# Patient Record
Sex: FEMALE | Race: WHITE | NOT HISPANIC OR LATINO | Employment: FULL TIME | URBAN - METROPOLITAN AREA
[De-identification: names, ages, dates, MRNs, and addresses within clinical notes are randomized per-mention and may not be internally consistent; named-entity substitution may affect disease eponyms.]

---

## 2017-04-14 ENCOUNTER — PRE VISIT (OUTPATIENT)
Dept: ENDOCRINOLOGY | Facility: CLINIC | Age: 42
End: 2017-04-14

## 2017-04-14 NOTE — TELEPHONE ENCOUNTER
Medical records are from [facility] and will be opened in [place] upon arrival of patient. Looks like she had NM testing done at [facility] and that has already been opened prior to visit.

## 2017-04-21 ENCOUNTER — OFFICE VISIT (OUTPATIENT)
Dept: ENDOCRINOLOGY | Facility: CLINIC | Age: 42
End: 2017-04-21

## 2017-04-21 VITALS
HEART RATE: 86 BPM | HEIGHT: 67 IN | BODY MASS INDEX: 25.75 KG/M2 | WEIGHT: 164.1 LBS | SYSTOLIC BLOOD PRESSURE: 130 MMHG | DIASTOLIC BLOOD PRESSURE: 70 MMHG

## 2017-04-21 DIAGNOSIS — C73 PAPILLARY THYROID CARCINOMA (H): ICD-10-CM

## 2017-04-21 DIAGNOSIS — C73 PAPILLARY THYROID CARCINOMA (H): Primary | ICD-10-CM

## 2017-04-21 DIAGNOSIS — R94.8 ABNORMAL RADIONUCLIDE BONE SCAN: ICD-10-CM

## 2017-04-21 DIAGNOSIS — E89.0 POSTSURGICAL HYPOTHYROIDISM: ICD-10-CM

## 2017-04-21 DIAGNOSIS — M89.9 BONE LESION: ICD-10-CM

## 2017-04-21 LAB
CALCIUM SERPL-MCNC: 9.1 MG/DL (ref 8.5–10.1)
T4 FREE SERPL-MCNC: 1.21 NG/DL (ref 0.76–1.46)
TSH SERPL DL<=0.05 MIU/L-ACNC: 5.97 MU/L (ref 0.4–4)

## 2017-04-21 RX ORDER — CHOLECALCIFEROL (VITAMIN D3) 100000/G
POWDER (GRAM) MISCELLANEOUS
COMMUNITY
End: 2017-08-23

## 2017-04-21 RX ORDER — LEVOTHYROXINE SODIUM 112 UG/1
112 TABLET ORAL
COMMUNITY
Start: 2017-02-06 | End: 2017-04-28 | Stop reason: DRUGHIGH

## 2017-04-21 ASSESSMENT — PAIN SCALES - GENERAL: PAINLEVEL: NO PAIN (0)

## 2017-04-21 NOTE — MR AVS SNAPSHOT
After Visit Summary   4/21/2017    Joy Quispe    MRN: [id number]           Patient Information     Date Of Birth          1975        Visit Information        Provider Department      4/21/2017 4:00 PM Katy Bah MD [facility]        Today's Diagnoses     Papillary thyroid carcinoma (H)    -  1    Postsurgical hypothyroidism           Follow-ups after your visit        Follow-up notes from your care team     Return in about 4 months (around 8/21/2017).      Your next 10 appointments already scheduled     Apr 21, 2017  5:00 PM CDT   LAB with  LAB   [facility] ([facility])    [address] [place] [id number] [phone]           Patient must bring picture ID.  Patient should be prepared to give a urine specimen  Please do not eat 10-12 hours before your appointment if you are coming in fasting for labs on lipids, cholesterol, or glucose (sugar).  Pregnant women should follow their Care Team instructions. Water with medications is okay. Do not drink coffee or other fluids.   If you have concerns about taking  your medications, please ask at office or if scheduling via Ink361, send a message by clicking on Secure Messaging, Message Your Care Team.            Aug 23, 2017  2:30 PM CDT   (Arrive by 2:15 PM)   RETURN ENDOCRINE with Katy Bah MD   [facility] ([facility])    [address] [place] [id number] [phone]              Future tests that were ordered for you today     Open Future Orders        Priority Expected Expires Ordered    Calcium Routine  4/21/2018 4/21/2017    TSH Routine  4/21/2018 4/21/2017    T4 free Routine  4/21/2018 4/21/2017    Thyroglobulin (Total, antibody & recovery %) Routine  4/21/2018 4/21/2017            Who to contact     Please call your clinic at [phone] to:    Ask questions about your health    Make or  "cancel appointments    Discuss your medicines    Learn about your test results    Speak to your doctor   If you have compliments or concerns about an experience at your clinic, or if you wish to file a complaint, please contact [facility] Patient Relations at [phone] or email us at [email or web address]         Additional Information About Your Visit        D[employer] Recomend[employer] Information     One-Songt is an electronic gateway that provides easy, online access to your medical records. With [employer], you can request a clinic appointment, read your test results, renew a prescription or communicate with your care team.     To sign up for [employer] visit the website at [email or web address]   You will be asked to enter the access code listed below, as well as some personal information. Please follow the directions to create your username and password.     Your access code is: [email or web address]  Expires: 2017  6:30 AM     Your access code will  in 90 days. If you need help or a new code, please contact your [facility] or call [phone] for assistance.        Care EveryWhere ID     This is your Care EveryWhere ID. This could be used by other organizations to access your [place] medical records  [id number]        Your Vitals Were     Pulse Height BMI (Body Mass Index)             86 1.702 m (5' 7\") 25.7 kg/m2          Blood Pressure from Last 3 Encounters:   17 130/70    Weight from Last 3 Encounters:   17 74.4 kg (164 lb 1.6 oz)               Primary Care Provider Office Phone # Fax #    Valeri Field [phone]       [facility] [address] [facility] [address]        Thank you!     Thank you for choosing [facility]  for your care. Our goal is always to provide you with excellent care. Hearing back from our patients is one way we can continue to improve our services. Please take a few minutes " to complete the written survey that you may receive in the mail after your visit with us. Thank you!             Your Updated Medication List - Protect others around you: Learn how to safely use, store and throw away your medicines at www.disposemymeds.org.          This list is accurate as of: 4/21/17  4:46 PM.  Always use your most recent med list.                   Brand Name Dispense Instructions for use    levonorgestrel 20 MCG/24HR IUD    MIRENA         SYNTHROID 112 MCG tablet   Generic drug:  levothyroxine      Take 112 mcg by mouth       Vitamin D3 738760 UNIT/GM Powd      5,000 Unit/kit by Misc.(Non-Drug; Combo Route) route once daily.

## 2017-04-21 NOTE — PROGRESS NOTES
Endocrine Consult note    Attending Assessment/Plan :     1.  Papillary thyroid carcinoma, unifocal 2.5 cm, isthmus region, LN +  pT2, pN1, pMx, possible cM1 but this is very unclear .  MACIS  40*0.08  2.5*0.3  0-1 for completeness  0 for ETE  0-3 for distant  MACISis 3.95 best case, and 7.95 worst case if she has distant mets and incomplete resection    I am not confident from review of the path report that the bulky mass seen inferior  thyroid on preop US was removed.   She will need earlier follow up neck US  -    2.  Post surgical hypothyroidism. Labs today. Treat to TSH < 0.4.    Labs following the appt show TSH 5.97.  Increase LT4 to 125 mcg/day.      3.  Left lower quadrant uptake on post therapy TBS performed at [facility] today - I see this on the post therapy scan that appeared on Care everywhere  following the appt. It is apparent that SPECT overlay was not performed which is very unfortunate.      4.  + SIMON , hashimoto on background thyroid. This will give falsely low Tg on the outside assay. Repeat Tg/ SIMON - we send out to [facility] which will use MIRNA in the presence of antibody.    5.  Abnormal bone CT, abnormal bone scan . This was discovered following the appt as we attempted to understand the question raised by the outside 1311 post therapy TBS.    A Right sacral ala on bone scan  - sclerotic on CT coronal per my review of images  B) left acetabular on bone scan, sclerotic- - this area is likely too low to correspond to what was seen on 131I .    Late SPECT overlay 131I TBS at [facility] did not show abnormal uptake but the signal was lower by then.     Katy Bah MD    Chief complaint:  Joy is a 41 year old female seen in consultation at the request of Dr Valeri Field for new thyroid cancer.   I have reviewed Care everywhere including provider notes, lab reports and imaging reports as indicated.        HISTORY OF PRESENT ILLNESS  She is already under the care of another endocrinologist, Dr Reza Ahn   "But she continues to have questions which is why she is presenting here today.  She has questions about her condition - Hashimotos, future plans  She had therapeutic 131I at [facility] last week with post therapy scan there today.  At the time of the appt I did not have the post therapy scan results. Joy had believed there were no problems or concerns with the studies.     In November she was found by her gynecologist, Dr Valeri Zhang,  to have a thyroid nodule. She had gone in for routine GYN evaluation due to her mother's diagnosis of uterine cancer.  Joy's exam showed a \"tiny right sided thyroid nodule\".  US  showed 3 nodules. She had FNAB of 2 nodules, (12/19/16) which  showed PCT.  She underwent total thyroidectomy 2/6/17 (Dr Espinoza Taylor, [facility]).  I have reviewed the operative report and surgical pathology report which was read as showing unifocal 2.5 cm Papillary thyroid carcinoma, isthmus with extension into right and left lobes.  3/6 lymph nodes were + for metastatic thyroid cancer.  Background lymphocytic thyroiditis was seen.   She has been on LT4 at the present dose since the surgery. She has been under the care of Dr Ahn. She says she had 2 dose Thyrogen (4/10, 4/11, dose 123I 4/12, scan 4/13 and treat 4/13).      She is currently on Synthroid brand 112 mcg/day since surgery.     We have the following imaging reports:   4/13/17: 123I 6.6 mCi total body scan (Thyrogen stimulated; [facility]) moderate uptake thyroid bed.   4/13/17: 101 mCi 131I  4/21/17 post treatment TBS - I didn't have this at the time of the appt but it became available afterwards.  The study is raising questions about the left lower quadrant, including left pelvis or GI.  4/21/17 labs following appt Tg 4.2, SIMON 115, TSH 5.97      4/19/04: TSH 3.48  1/11/05: TSH 1.27, free T4 1.3  9/27/05: , free T4 1.1  3/8/08: TSH 2.86, free T4 1.1  11/9/10: TSH 2.71  9/1916: TSH 2.31  10/21/16: TPO 1060  4/13/17: Tg " 0.1, SIMON 210.3    I have reviewed images on PACS (all long  following the appt)  10/24/16 thyroid US: subtle swiss cheese thyroid; right isthmus suspicoius nodule - irregular, hypoechoic, 1.5 x 0.9 x 1.5 cm , microcalc- this had FNAB 12/19/16   midline isthmus hypoechoic well defined mass 1.1 x 0.5 x 1 cm  - this could be LN-- this had NFAB 12/19/16  Mass inferior to thyroid 3  X 1.2 x 1.5 cm  12/19/16 FNAB of 2 thyroid masses  4/13/17 123I TBS  - neck uptake - no other abnormal uptake  4/21/17: post therapy scan ; neck uptake.  I can see what the radiologist was referring to but I wouldn't have called it - tiny focus left abdomen lateral  And also 2 foci midline pelvis- one I would attribute to bladder and the other ?   4/28/17 CT pelvis -I can see the sclerotic bone lesions as described by radiologist.  I agree they do not correspond to likely location of 131I uptake seen on outside TBS . Radiologist states some definitely represent bone islands but some are indeterminant .  5/5/17 bone scan: 2 areas noted by radiologist s- anterior column left acetabulum and right sacral body.      REVIEW OF SYSTEMS  Feels better than prior to diagnosis - used ot have symptoms that are all gone  No longer had hair loss  Losing the weight  Cardiac: negative  Respiratory: hasn't returned to exericse;   GI: negative  Less brain foggy  No headaches if she avoids glutern  LMP prior to IUD - hasn't had one in about 6 years  Headaches rarely  10 system ROS otherwise as per the HPI or negative    Past Medical History  Past Medical History:   Diagnosis Date     Papillary thyroid carcinoma (H) 02/06/2017     Post-surgical hypothyroidism 02/06/2017       Medications    Current Outpatient Prescriptions   Medication Sig Dispense Refill     levothyroxine (SYNTHROID) 112 MCG tablet Take 112 mcg by mouth       levonorgestrel (MIRENA) 20 MCG/24HR IUD        Cholecalciferol (VITAMIN D3) 648896 UNIT/GM POWD 5,000 Unit/kit by Misc.(Non-Drug; Combo  "Route) route once daily.         Allergies  No Known Allergies    Family History  family history includes Endometrial Cancer in her mother; Thyroid Disease in her maternal grandmother. There is no history of Thyroid Cancer or DIABETES.     Goiter in maternal great GM;     Social History  Social History   Substance Use Topics     Smoking status: Not on file     Smokeless tobacco: Not on file     Alcohol use Not on file     2 kids, 12 and 10 yrs of ; not back to running again .  ;     Physical Exam  /70  Pulse 86  Ht 1.702 m (5' 7\")  Wt 74.4 kg (164 lb 1.6 oz)  BMI 25.7 kg/m2  Body mass index is 25.7 kg/(m^2).  GENERAL :young woman   In no apparent distress  SKIN: Normal color, normal temperature, texture.  No hirsutism, alopecia or purple striae.     EYES: PERRL, EOMI, No scleral icterus,  No proptosis, conjunctival redness, stare, retraction  MOUTH: Moist, pink; pharynx clear  NECK: low transverse surgical scar.  No visible masses. No palpable adenopathy, or masses. No carotid bruits.   THYROID:  Not palpable  RESP: Lungs clear to auscultation bilaterally  CARDIAC: Regular rate and rhythm, normal S1 S2, without murmurs, rubs or gallops    ABDOMEN: Normal bowel sounds; soft, nontender, no HSM or masses       NEURO: awake, alert, responds appropriately to questions.  Cranial nerves intact.  Moves all extremities; Gait normal.  No tremor of the outstretched hand.  DTRs  2 /4 with delayed relaxation phase KJ ,   EXTREMITIES: No clubbing, cyanosis or edema.    DATA REVIEW  Results for RHIANNON SILVA (MRN [id number]) as of 4/21/2017 18:28   Ref. Range 4/21/2017 17:01   Calcium Latest Ref Range: 8.5 - 10.1 mg/dL 9.1   T4 Free Latest Ref Range: 0.76 - 1.46 ng/dL 1.21   TSH Latest Ref Range: 0.40 - 4.00 mU/L 5.97 (H)     Comment:  [facility]  Surgical Pathology Laboratory  [address] [place]   [address]  Tel: [phone]  Fax:  [phone]    SURGICAL PATHOLOGY " REPORT    Patient Name: RHIANNON SILVA Specimen No: [id number] Room No: [facility] [place] Age:  41 Sex: F Taken: 2017 Med. Rec. #: [id number] :  1975 Received: 2017 Physician(s): ROWENA Azul MD Service:  Reported: 2017   Encounter  No: [id number]       FINAL DIAGNOSIS    Thyroid, total thyroidectomy -        Papillary carcinoma       Metastatic carcinoma involves three of six perithyroidal  lymph nodes (3/6)    Procedure:  Total thyroidectomy     Lymph node sampling:  Focused lymph node resection        Tumor laterality:  Isthmus, with extension into right and left  lobes  Tumor focality:  Unifocal      Tumor size:  2.5 cm greatest dimension    Histologic type:  Papillary carcinoma      Margins:  Indeterminate, cauterized tumor abuts posterior margin  Angioinvasion (vascular invasion): Not identified  Lymphatic invasion:  Not identified  Perineural invasion:  Not identified  Extrathyroidal extension:  Present, minimal  Regional lymph nodes:  Metastatic carcinoma involves three of six  lymph nodes (3/6)     Extranodal extension: not identified  Additional pathologic findings: Lymphocytic thyroiditis, small  colloid nodules  Tumor block(s) for molecular/send out tests: A4   Pathologic Staging (AJCC 7th ed):  pT3   N1a                               Electronically Signed Out   Eric N. Kehrberg MD   jmb    ____________________________________________    Clinical History  Papillary thyroid carcinoma ([id number])    Gross  Thyroid and lymph nodes:  A 21 gram total thyroid consisting of a  left lobe (5 cm superior to inferior, 2 cm transversely, 1.5 cm  anterior to posterior) isthmus (2.5 cm superior to inferior, 1 cm  anterior to posterior, 1.2 cm transversely) and right lobe (4.5  cm superior to inferior, 2.5 cm transversely and 1.5 cm anterior  to posterior).  The specimen is inked as follows:  anterior -  blue; posterior - black.  Sectioning reveals a 2 x 1.5 x 1 cm  colloid nodule in  the mid to inferior aspect of the left lobe.   The isthmus is replaced by a 2.5 x 1.3 x 1 cm yellow-tan firm  mass that extends into the right upper lobe, and to a lesser  extent the left upper lobe.  The remaining right lobe is  sectioned to reveal small colloid nodules.  The uninvolved  thyroid parenchyma is beefy red and unremarkable.  There are  three separate lymph nodes ranging from 0.4 to 0.6cm in greatest  dimension.  Summary of cassettes: 1-3 - mass from isthmus  extending into right lobe; 4-5 - entire isthmus mass; 6 - mass  extending to left lobe; 7 - nodule from left lobe; 8 - right  lobe; 9 - left lobe; 10 - three lymph nodes, one inked blue, one  inked black, each bisected.  This case is reviewed with Dr. Kehrberg.  ()     MICROSCOPIC  Sections from the mass show papillary thyroid carcinoma that  focally invades into the perithyroid soft tissues.  Cauterized  tumor abuts the posterior margin, precluding definitive  evaluation, although no unequivocal margin involvement is seen.   The uninvolved thyroid shows colloid nodules and background  lymphocytic thyroiditis.  Metastatic carcinoma involves three of  six perithyroidal lymph nodes, including one of the three that  were separately submitted.      CPT CODES  A: 76701    Result Narrative   Indication:  41-year-old female.  History of thyroidectomy for thyroid carcinoma.  High dose I 131 therapy performed April 13, 2017.  Follow up.    Technique:  Post treatment I 131 whole body scan.    Comparison:  I-123 whole body scan April 13, 2017.    Findings:  Single focus of activity in the thyroid bed is the remnant being treated.    There is a small nodular focus of activity projected over the left lower quadrant. This was not present on the I-123 scan It is uncertain if this is within the left iliac bone or merely focal activity in the GI tract.  Although this is more likely within the gastrointestinal tract, consider a limited CT of the pelvis to assess  "for any skeletal metastases or lymph nodes.    Impression:   1. Focal activity within the thyroid bed which is the remnant being treated.    2. Focal activity left lower quadrant of uncertain anatomic position.  This could be within the GI tract or perhaps the iliac bone. Consider CT of the pelvis.    Dictated by Kirill Tirado MD @ 2017  3:25PM    (Electronically Signed)     Comment:  [facility]  Surgical Pathology Laboratory  [address] [place] [address]   [address]  Tel: [phone]  Fax:  [phone]    SURGICAL PATHOLOGY REPORT    Patient Name: RHIANNON SILVA Specimen No: [id number] Room No: OP   Age:  41 Sex: F Taken: 2016 Med. Rec. #: [id number] :  1975 Received: 2016 Physician(s): Reza Berg MD Service: SPEC IMG Reported: 2016    Encounter No: [id number]       FINAL DIAGNOSIS    A.     Inferior to thyroid nodule, fine needle aspiration -  Papillary carcinoma.   B.     Isthmus nodule, fine needle aspiration - Papillary  carcinoma.  C.     Right lower pole nodule, fine needle aspiration -  Papillary carcinoma.                                                                                                                                                                                                                  Electronically Signed Out   Marie Barriga MD       ____________________________________________      Gross       A.     Inferior to thyroid, 3.0 x 1.5 x 1.2 cm, fine needle  aspiration:  ([id number]) Rapid on-site evaluation for adequacy is  performed by Dr. Ho and reported to  Dr. Berg  as  follows:         Evaluation episode #1:  \"adequate\"       Pass #1-5:  Slides prepared: 3 Diff-Quik, 2 H&E         B.     Isthmus 1.1 x 0.9 x 0.5 cm, fine needle aspiration:   Rapid on-site evaluation for adequacy is performed by Dr. Ho and reported to  Dr. Berg as follows:         " "Evaluation episode #1:  \"adequate\"       Pass #1-5:  Slides prepared: 3 Diff-Quik, 2 H&E         C.     Right lower pole, 1.5 x 1.5 x 0.9 cm, fine needle  aspiration:  Rapid on-site evaluation for adequacy is performed  by Dr. Ho and reported to  Dr. Berg as follows:         Evaluation episode #1:  \"adequate\"       Pass #1-5:  Slides prepared: 3 Diff-Quik, 2 H&E                MICROSCOPIC       A-C      The smears show multiple aggregates of follicular  epithelial cells with numerous intranuclear inclusions, variation  in nuclear size, and focal crowding. Multinucleated cells are  identified. There are no psammoma bodies. In part C, there is a  small amount of thick colloid. Parts B and C show  three-dimensional structures consistent with papillary  architecture. Overall, the findings are consistent with papillary  carcinoma.          CPT CODES  A: 11521, 98957  B: 23108, 21864  C: 16662, 43069    NM ONCOLOGY THYROID WHOLE BODY POST TREATMENT4/13/2017  [facility]  Result Narrative   HISTORY:  41-year-old female.  Status post total thyroidectomy at [facility] on 02/06/2017 for papillary thyroid carcinoma.  Tumor measured 2.5 cm greatest dimension and involved the isthmus was with extension into the right and left lobes.  Cauterized tumor abutted the posterior margin.  Metastatic carcinoma involved 3 of 6 lymph nodes taken.    Technique:  This study is performed with the Thyrogen protocol.  6.6 millicuries of I 123 was administered orally on 04/12/2017.  24 hour whole body images were obtained in the anterior and posterior projections.    Findings:  There is physiologic uptake of activity by the salivary glands, GI and  tracts.    Moderate uptake is noted in the thyroid bed in the neck.    There is no evidence of distant metastatic disease outside the neck.    Impression:   1. There is moderate uptake noted in the thyroid bed in the neck.   2. There is " no evidence of distant metastatic disease outside the neck.   3. These findings were called to and discussed with Dr. Ahn. Dr. Ahn recommended high dose I 131 ablation treatment.    Dictated by Les Beasley MD @ Apr 13 2017 10:54AM    (Electronically Signed)       US YNRUYDU29/24/2016  [facility]  Result Impression   IMPRESSION:    1.  Diffusely heterogeneous echotexture throughout the thyroid parenchyma.  2.  There are 2 distinct solid nodules, one in the right lobe, and the other within the isthmus. The patient reports a prior benign thyroid biopsy, presumably of one of these nodules. No comparison examination available.  3.  In addition, there is a bilobed 3.0 x 1.5 x 1.2 cm solid nodule inferior to the thyroid gland. No clear claw sign with the adjacent thyroid gland, but this could be an exophytic thyroid nodule. Appearance is not typical of a parathyroid adenoma. Ultrasound-guided fine-needle aspiration could be attempted.   Result Narrative   EXAM: THYROID ULTRASOUND, 10/24/2016    CLINICAL DATA: Thyroid nodules. Prior benign thyroid biopsy.    COMPARISON: None available    TECHNIQUE: High frequency linear transducer with grayscale and color Doppler imaging.    FINDINGS: The right lobe of the thyroid measures 5.1 x 1.9 x 1.7 cm. Parenchyma is diffusely heterogeneous. There is a solid slightly hypoechoic nodule near the junction with the isthmus measuring 1.5 x 1.5 x 0.9 cm.    The left lobe of the thyroid measures 4.0 x 1.8 x 1.6 cm. Parenchyma is diffusely heterogeneous without a discrete nodule.    The isthmus is thickened, 8 mm. 1.1 x 0.9 x 0.5 cm solid oval hypoechoic solid nodule within the isthmus.    Hypoechoic bilobed nodule inferior to the thyroid measuring 3.0 x 1.5 x 1.2 cm.

## 2017-04-21 NOTE — LETTER
4/21/2017       RE: Joy Quispe  [address] [place] [address]     Dear Colleague,    Thank you for referring your patient, Joy Quispe, to the [facility] ENDOCRINOLOGY at [facility]. Please see a copy of my visit note below.    4/27/17 phone conversatoin with Joy  (945 PM, she returned from [place] I had sent her yesterday) about the post therapy scan report from [facility].  I will order pelvic CT to answer the question about their scan.    Katy Bah MD    4/28/17 phone conversation with Joy (533 PM).  I have informed her of the results of CT and recommendation for bone scan.  I am  Unable to explain to her the differential diagnosis for the CT bone findings. I  Have recommended we get the bone scan as recommended by radiology.    Katy Bah MD    Endocrine Consult note    Attending Assessment/Plan :     1.  Papillary thyroid carcinoma, unifocal 2.5 cm, isthmus region, LN +  pT2, pN1, pMx, possible cM1 but this is very unclear .  MACIS  40*0.08  2.5*0.3  0-1 for completeness  0 for ETE  0-3 for distant  MACISis 3.95 best case, and 7.95 worst case if she has distant mets and incomplete resection    I am not confident from review of the path report that the bulky mass seen inferior  thyroid on preop US was removed.   She will need earlier follow up neck US  -    2.  Post surgical hypothyroidism. Labs today. Treat to TSH < 0.4.    Labs following the appt show TSH 5.97.  Increase LT4 to 125 mcg/day.      3.  Left lower quadrant uptake on post therapy TBS performed at [facility] today - I see this on the post therapy scan that appeared on Care everywhere  following the appt. It is apparent that SPECT overlay was not performed which is very unfortunate.      4.  + SIMON , hashimoto on background thyroid. This will give falsely low Tg on the outside assay. Repeat Tg/ SIMON - we send out to [facility] which will use MIRNA in the presence of antibody.    5.  Abnormal bone  "CT, abnormal bone scan . This was discovered following the appt as we attempted to understand the question raised by the outside 1311 post therapy TBS.    A Right sacral ala on bone scan  - sclerotic on CT coronal per my review of images  B) left acetabular on bone scan, sclerotic- - this area is likely too low to correspond to what was seen on 131I .    Late SPECT overlay 131I TBS at [facility] did not show abnormal uptake but the signal was lower by then.     Katy Bah MD    Chief complaint:  Joy is a 41 year old female seen in consultation at the request of Dr Valeri Field for new thyroid cancer.   I have reviewed Care everywhere including provider notes, lab reports and imaging reports as indicated.        HISTORY OF PRESENT ILLNESS  She is already under the care of another endocrinologist, Dr Reza Ahn  But she continues to have questions which is why she is presenting here today.  She has questions about her condition - Hashimotos, future plans  She had therapeutic 131I at [facility] last week with post therapy scan there today.  At the time of the appt I did not have the post therapy scan results. Joy had believed there were no problems or concerns with the studies.     In November she was found by her gynecologist, Dr Valeri Zhang,  to have a thyroid nodule. She had gone in for routine GYN evaluation due to her mother's diagnosis of uterine cancer.  Joy's exam showed a \"tiny right sided thyroid nodule\".  US  showed 3 nodules. She had FNAB of 2 nodules, (12/19/16) which  showed PCT.  She underwent total thyroidectomy 2/6/17 (Dr Espinoza Taylor, [facility]).  I have reviewed the operative report and surgical pathology report which was read as showing unifocal 2.5 cm Papillary thyroid carcinoma, isthmus with extension into right and left lobes.  3/6 lymph nodes were + for metastatic thyroid cancer.  Background lymphocytic thyroiditis was seen.   She has been on LT4 at the present dose since " the surgery. She has been under the care of Dr Ahn. She says she had 2 dose Thyrogen (4/10, 4/11, dose 123I 4/12, scan 4/13 and treat 4/13).      She is currently on Synthroid brand 112 mcg/day since surgery.     We have the following imaging reports:   4/13/17: 123I 6.6 mCi total body scan (Thyrogen stimulated; [facility]) moderate uptake thyroid bed.   4/13/17: 101 mCi 131I  4/21/17 post treatment TBS - I didn't have this at the time of the appt but it became available afterwards.  The study is raising questions about the left lower quadrant, including left pelvis or GI.  4/21/17 labs following appt Tg 4.2, SIMON 115, TSH 5.97      4/19/04: TSH 3.48  1/11/05: TSH 1.27, free T4 1.3  9/27/05: , free T4 1.1  3/8/08: TSH 2.86, free T4 1.1  11/9/10: TSH 2.71  9/1916: TSH 2.31  10/21/16: TPO 1060  4/13/17: Tg 0.1, SIMON 210.3    I have reviewed images on PACS (all long  following the appt)  10/24/16 thyroid US: subtle swiss cheese thyroid; right isthmus suspicoius nodule - irregular, hypoechoic, 1.5 x 0.9 x 1.5 cm , microcalc- this had FNAB 12/19/16   midline isthmus hypoechoic well defined mass 1.1 x 0.5 x 1 cm  - this could be LN-- this had NFAB 12/19/16  Mass inferior to thyroid 3  X 1.2 x 1.5 cm  12/19/16 FNAB of 2 thyroid masses  4/13/17 123I TBS  - neck uptake - no other abnormal uptake  4/21/17: post therapy scan ; neck uptake.  I can see what the radiologist was referring to but I wouldn't have called it - tiny focus left abdomen lateral  And also 2 foci midline pelvis- one I would attribute to bladder and the other ?   4/28/17 CT pelvis -I can see the sclerotic bone lesions as described by radiologist.  I agree they do not correspond to likely location of 131I uptake seen on outside TBS . Radiologist states some definitely represent bone islands but some are indeterminant .  5/5/17 bone scan: 2 areas noted by radiologist s- anterior column left acetabulum and right sacral body.      REVIEW OF  "SYSTEMS  Feels better than prior to diagnosis - used ot have symptoms that are all gone  No longer had hair loss  Losing the weight  Cardiac: negative  Respiratory: hasn't returned to exericse;   GI: negative  Less brain foggy  No headaches if she avoids glutern  LMP prior to IUD - hasn't had one in about 6 years  Headaches rarely  10 system ROS otherwise as per the HPI or negative    Past Medical History  Past Medical History:   Diagnosis Date     Papillary thyroid carcinoma (H) 02/06/2017     Post-surgical hypothyroidism 02/06/2017       Medications    Current Outpatient Prescriptions   Medication Sig Dispense Refill     levothyroxine (SYNTHROID) 112 MCG tablet Take 112 mcg by mouth       levonorgestrel (MIRENA) 20 MCG/24HR IUD        Cholecalciferol (VITAMIN D3) 975574 UNIT/GM POWD 5,000 Unit/kit by Misc.(Non-Drug; Combo Route) route once daily.         Allergies  No Known Allergies    Family History  family history includes Endometrial Cancer in her mother; Thyroid Disease in her maternal grandmother. There is no history of Thyroid Cancer or DIABETES.     Goiter in maternal great GM;     Social History  Social History   Substance Use Topics     Smoking status: Not on file     Smokeless tobacco: Not on file     Alcohol use Not on file     2 kids, 12 and 10 yrs of ; not back to running again .  ;     Physical Exam  /70  Pulse 86  Ht 1.702 m (5' 7\")  Wt 74.4 kg (164 lb 1.6 oz)  BMI 25.7 kg/m2  Body mass index is 25.7 kg/(m^2).  GENERAL :young woman   In no apparent distress  SKIN: Normal color, normal temperature, texture.  No hirsutism, alopecia or purple striae.     EYES: PERRL, EOMI, No scleral icterus,  No proptosis, conjunctival redness, stare, retraction  MOUTH: Moist, pink; pharynx clear  NECK: low transverse surgical scar.  No visible masses. No palpable adenopathy, or masses. No carotid bruits.   THYROID:  Not palpable  RESP: Lungs clear to auscultation bilaterally  CARDIAC: Regular rate " and rhythm, normal S1 S2, without murmurs, rubs or gallops    ABDOMEN: Normal bowel sounds; soft, nontender, no HSM or masses       NEURO: awake, alert, responds appropriately to questions.  Cranial nerves intact.  Moves all extremities; Gait normal.  No tremor of the outstretched hand.  DTRs  2 /4 with delayed relaxation phase KJ ,   EXTREMITIES: No clubbing, cyanosis or edema.    DATA REVIEW  Results for RHIANNON SILVA (MRN [id number]) as of 2017 18:28   Ref. Range 2017 17:01   Calcium Latest Ref Range: 8.5 - 10.1 mg/dL 9.1   T4 Free Latest Ref Range: 0.76 - 1.46 ng/dL 1.21   TSH Latest Ref Range: 0.40 - 4.00 mU/L 5.97 (H)     Comment:  [facility]  Surgical Pathology Laboratory  [address] [place] [address]   [address]  Tel: [phone]  Fax:  [phone]    SURGICAL PATHOLOGY REPORT    Patient Name: RHIANNON SILVA Specimen No: [id number] Room No: [facility] Age:  41 Sex: F Taken: 2017 Med. Rec. #: [id number] :  1975 Received: 2017 Physician(s): ROWENA Azul MD Service:  Reported: 2017   Encounter  No: [id number]       FINAL DIAGNOSIS    Thyroid, total thyroidectomy -        Papillary carcinoma       Metastatic carcinoma involves three of six perithyroidal  lymph nodes (3/6)    Procedure:  Total thyroidectomy     Lymph node sampling:  Focused lymph node resection        Tumor laterality:  Isthmus, with extension into right and left  lobes  Tumor focality:  Unifocal      Tumor size:  2.5 cm greatest dimension    Histologic type:  Papillary carcinoma      Margins:  Indeterminate, cauterized tumor abuts posterior margin  Angioinvasion (vascular invasion): Not identified  Lymphatic invasion:  Not identified  Perineural invasion:  Not identified  Extrathyroidal extension:  Present, minimal  Regional lymph nodes:  Metastatic carcinoma involves three of six  lymph nodes (3/6)     Extranodal extension: not identified  Additional  pathologic findings: Lymphocytic thyroiditis, small  colloid nodules  Tumor block(s) for molecular/send out tests: A4   Pathologic Staging (AJCC 7th ed):  pT3   N1a                               Electronically Signed Out   Eric N. Kehrberg MD jmb    ____________________________________________    Clinical History  Papillary thyroid carcinoma ([id number])    Gross  Thyroid and lymph nodes:  A 21 gram total thyroid consisting of a  left lobe (5 cm superior to inferior, 2 cm transversely, 1.5 cm  anterior to posterior) isthmus (2.5 cm superior to inferior, 1 cm  anterior to posterior, 1.2 cm transversely) and right lobe (4.5  cm superior to inferior, 2.5 cm transversely and 1.5 cm anterior  to posterior).  The specimen is inked as follows:  anterior -  blue; posterior - black.  Sectioning reveals a 2 x 1.5 x 1 cm  colloid nodule in the mid to inferior aspect of the left lobe.   The isthmus is replaced by a 2.5 x 1.3 x 1 cm yellow-tan firm  mass that extends into the right upper lobe, and to a lesser  extent the left upper lobe.  The remaining right lobe is  sectioned to reveal small colloid nodules.  The uninvolved  thyroid parenchyma is beefy red and unremarkable.  There are  three separate lymph nodes ranging from 0.4 to 0.6cm in greatest  dimension.  Summary of cassettes: 1-3 - mass from isthmus  extending into right lobe; 4-5 - entire isthmus mass; 6 - mass  extending to left lobe; 7 - nodule from left lobe; 8 - right  lobe; 9 - left lobe; 10 - three lymph nodes, one inked blue, one  inked black, each bisected.  This case is reviewed with Dr. Kehrberg.  (JR)     MICROSCOPIC  Sections from the mass show papillary thyroid carcinoma that  focally invades into the perithyroid soft tissues.  Cauterized  tumor abuts the posterior margin, precluding definitive  evaluation, although no unequivocal margin involvement is seen.   The uninvolved thyroid shows colloid nodules and background  lymphocytic  thyroiditis.  Metastatic carcinoma involves three of  six perithyroidal lymph nodes, including one of the three that  were separately submitted.      CPT CODES  A: 39393    Result Narrative   Indication:  41-year-old female.  History of thyroidectomy for thyroid carcinoma.  High dose I 131 therapy performed 2017.  Follow up.    Technique:  Post treatment I 131 whole body scan.    Comparison:  I-123 whole body scan 2017.    Findings:  Single focus of activity in the thyroid bed is the remnant being treated.    There is a small nodular focus of activity projected over the left lower quadrant. This was not present on the I-123 scan It is uncertain if this is within the left iliac bone or merely focal activity in the GI tract.  Although this is more likely within the gastrointestinal tract, consider a limited CT of the pelvis to assess for any skeletal metastases or lymph nodes.    Impression:   1. Focal activity within the thyroid bed which is the remnant being treated.    2. Focal activity left lower quadrant of uncertain anatomic position.  This could be within the GI tract or perhaps the iliac bone. Consider CT of the pelvis.    Dictated by Kirill Tirado MD @ 2017  3:25PM    (Electronically Signed)     Comment:  [facility]  Surgical Pathology Laboratory  [address] [place] [address]   [address]  Tel: [phone]  Fax:  [phone]    SURGICAL PATHOLOGY REPORT    Patient Name: RHIANNON SILVA Specimen No: [id number] Room No: OP  US Age:  41 Sex: F Taken: 2016 Med. Rec. #: [id number] :  1975 Received: 2016 Physician(s): Reza Berg MD Service: SPEC IMG Reported: 2016    Encounter No: [id number]       FINAL DIAGNOSIS    A.     Inferior to thyroid nodule, fine needle aspiration -  Papillary carcinoma.   B.     Isthmus nodule, fine needle aspiration - Papillary  carcinoma.  C.     Right lower pole nodule, fine  "needle aspiration -  Papillary carcinoma.                                                                                                                                                                                                                  Electronically Signed Out   Marie Barriga MD   kp    ____________________________________________      Gross       A.     Inferior to thyroid, 3.0 x 1.5 x 1.2 cm, fine needle  aspiration:  ([id number]) Rapid on-site evaluation for adequacy is  performed by Dr. Ho and reported to  Dr. Berg  as  follows:         Evaluation episode #1:  \"adequate\"       Pass #1-5:  Slides prepared: 3 Diff-Quik, 2 H&E         B.     Isthmus 1.1 x 0.9 x 0.5 cm, fine needle aspiration:   Rapid on-site evaluation for adequacy is performed by Dr. Ho and reported to  Dr. Berg as follows:         Evaluation episode #1:  \"adequate\"       Pass #1-5:  Slides prepared: 3 Diff-Quik, 2 H&E         C.     Right lower pole, 1.5 x 1.5 x 0.9 cm, fine needle  aspiration:  Rapid on-site evaluation for adequacy is performed  by Dr. Ho and reported to  Dr. Berg as follows:         Evaluation episode #1:  \"adequate\"       Pass #1-5:  Slides prepared: 3 Diff-Quik, 2 H&E                MICROSCOPIC       A-C      The smears show multiple aggregates of follicular  epithelial cells with numerous intranuclear inclusions, variation  in nuclear size, and focal crowding. Multinucleated cells are  identified. There are no psammoma bodies. In part C, there is a  small amount of thick colloid. Parts B and C show  three-dimensional structures consistent with papillary  architecture. Overall, the findings are consistent with papillary  carcinoma.          CPT CODES  A: 74936, 06117  B: 54049, 62276  C: 22827, 00009    NM ONCOLOGY THYROID WHOLE BODY POST TREATMENT4/13/2017  [facility]  Result Narrative   HISTORY:  41-year-old female.  Status post " total thyroidectomy at [facility] on 02/06/2017 for papillary thyroid carcinoma.  Tumor measured 2.5 cm greatest dimension and involved the isthmus was with extension into the right and left lobes.  Cauterized tumor abutted the posterior margin.  Metastatic carcinoma involved 3 of 6 lymph nodes taken.    Technique:  This study is performed with the Thyrogen protocol.  6.6 millicuries of I 123 was administered orally on 04/12/2017.  24 hour whole body images were obtained in the anterior and posterior projections.    Findings:  There is physiologic uptake of activity by the salivary glands, GI and  tracts.    Moderate uptake is noted in the thyroid bed in the neck.    There is no evidence of distant metastatic disease outside the neck.    Impression:   1. There is moderate uptake noted in the thyroid bed in the neck.   2. There is no evidence of distant metastatic disease outside the neck.   3. These findings were called to and discussed with Dr. Ahn. Dr. Ahn recommended high dose I 131 ablation treatment.    Dictated by Les Beasley MD @ Apr 13 2017 10:54AM    (Electronically Signed)       US [id number]/24/2016  [facility]  Result Impression   IMPRESSION:    1.  Diffusely heterogeneous echotexture throughout the thyroid parenchyma.  2.  There are 2 distinct solid nodules, one in the right lobe, and the other within the isthmus. The patient reports a prior benign thyroid biopsy, presumably of one of these nodules. No comparison examination available.  3.  In addition, there is a bilobed 3.0 x 1.5 x 1.2 cm solid nodule inferior to the thyroid gland. No clear claw sign with the adjacent thyroid gland, but this could be an exophytic thyroid nodule. Appearance is not typical of a parathyroid adenoma. Ultrasound-guided fine-needle aspiration could be attempted.   Result Narrative   EXAM: THYROID ULTRASOUND, 10/24/2016    CLINICAL DATA: Thyroid nodules. Prior benign thyroid  biopsy.    COMPARISON: None available    TECHNIQUE: High frequency linear transducer with grayscale and color Doppler imaging.    FINDINGS: The right lobe of the thyroid measures 5.1 x 1.9 x 1.7 cm. Parenchyma is diffusely heterogeneous. There is a solid slightly hypoechoic nodule near the junction with the isthmus measuring 1.5 x 1.5 x 0.9 cm.    The left lobe of the thyroid measures 4.0 x 1.8 x 1.6 cm. Parenchyma is diffusely heterogeneous without a discrete nodule.    The isthmus is thickened, 8 mm. 1.1 x 0.9 x 0.5 cm solid oval hypoechoic solid nodule within the isthmus.    Hypoechoic bilobed nodule inferior to the thyroid measuring 3.0 x 1.5 x 1.2 cm.       Again, thank you for allowing me to participate in the care of your patient.      Sincerely,    Katy Bah MD

## 2017-04-28 DIAGNOSIS — E89.0 POSTSURGICAL HYPOTHYROIDISM: ICD-10-CM

## 2017-04-28 DIAGNOSIS — C73 PAPILLARY THYROID CARCINOMA (H): Primary | ICD-10-CM

## 2017-04-28 RX ORDER — LEVOTHYROXINE SODIUM 125 UG/1
125 TABLET ORAL DAILY
Qty: 90 TABLET | Refills: 3 | Status: SHIPPED | OUTPATIENT
Start: 2017-04-28 | End: 2017-05-09 | Stop reason: ALTCHOICE

## 2017-04-28 NOTE — PROGRESS NOTES
4/27/17 phone conversatoin with Joy  (945 PM, she returned from [facility] I had sent her yesterday) about the post therapy scan report from [facility].  I will order pelvic CT to answer the question about their scan.    Katy Bah MD    4/28/17 phone conversation with Joy (533 PM).  I have informed her of the results of CT and recommendation for bone scan.  I am  Unable to explain to her the differential diagnosis for the CT bone findings. I  Have recommended we get the bone scan as recommended by radiology.    Katy Bah MD

## 2017-05-05 ENCOUNTER — HOSPITAL ENCOUNTER (OUTPATIENT)
Dept: NUCLEAR MEDICINE | Facility: CLINIC | Age: 42
Setting detail: NUCLEAR MEDICINE
End: 2017-05-05
Payer: COMMERCIAL

## 2017-05-05 ENCOUNTER — HOSPITAL ENCOUNTER (OUTPATIENT)
Dept: NUCLEAR MEDICINE | Facility: CLINIC | Age: 42
Setting detail: NUCLEAR MEDICINE
Discharge: HOME | End: 2017-05-05
Payer: COMMERCIAL

## 2017-05-05 DIAGNOSIS — C73 PAPILLARY THYROID CARCINOMA (H): ICD-10-CM

## 2017-05-05 LAB — LAB SCANNED RESULT: NORMAL

## 2017-05-05 PROCEDURE — 78306 BONE IMAGING WHOLE BODY: CPT

## 2017-05-05 PROCEDURE — 78399 UNLISTED MUSCSKEL PX DX NUC: CPT

## 2017-05-05 PROCEDURE — 34300033 ZZH RX 343

## 2017-05-05 PROCEDURE — A9503 TC99M MEDRONATE: HCPCS

## 2017-05-05 RX ORDER — TC 99M MEDRONATE 20 MG/10ML
20-30 INJECTION, POWDER, LYOPHILIZED, FOR SOLUTION INTRAVENOUS ONCE
Status: COMPLETED | OUTPATIENT
Start: 2017-05-05 | End: 2017-05-05

## 2017-05-05 RX ADMIN — TC 99M MEDRONATE 23.35 MCI.: 20 INJECTION, POWDER, LYOPHILIZED, FOR SOLUTION INTRAVENOUS at 12:10

## 2017-05-09 DIAGNOSIS — C73 PAPILLARY THYROID CARCINOMA (H): Primary | ICD-10-CM

## 2017-05-09 DIAGNOSIS — R94.8 ABNORMAL RADIONUCLIDE BONE SCAN: ICD-10-CM

## 2017-05-09 DIAGNOSIS — E89.0 POSTSURGICAL HYPOTHYROIDISM: ICD-10-CM

## 2017-05-09 RX ORDER — LEVOTHYROXINE SODIUM 125 MCG
125 TABLET ORAL DAILY
Qty: 90 TABLET | Refills: 3 | Status: SHIPPED | OUTPATIENT
Start: 2017-05-09 | End: 2017-08-23 | Stop reason: DRUGHIGH

## 2017-05-12 DIAGNOSIS — E89.0 POSTSURGICAL HYPOTHYROIDISM: ICD-10-CM

## 2017-05-12 DIAGNOSIS — R94.8 ABNORMAL RADIONUCLIDE BONE SCAN: ICD-10-CM

## 2017-05-12 DIAGNOSIS — C73 PAPILLARY THYROID CARCINOMA (H): ICD-10-CM

## 2017-05-12 PROBLEM — M89.9 BONE LESION: Status: ACTIVE | Noted: 2017-05-12

## 2017-05-12 LAB
CA-I SERPL ISE-MCNC: 5 MG/DL (ref 4.4–5.2)
PHOSPHATE SERPL-MCNC: 3.5 MG/DL (ref 2.5–4.5)
PTH-INTACT SERPL-MCNC: 22 PG/ML (ref 12–72)

## 2017-05-12 PROCEDURE — 82330 ASSAY OF CALCIUM: CPT

## 2017-05-12 PROCEDURE — 99000 SPECIMEN HANDLING OFFICE-LAB: CPT

## 2017-05-12 PROCEDURE — 84080 ASSAY ALKALINE PHOSPHATASES: CPT | Mod: 90

## 2017-05-12 PROCEDURE — 82306 VITAMIN D 25 HYDROXY: CPT

## 2017-05-12 PROCEDURE — 84100 ASSAY OF PHOSPHORUS: CPT

## 2017-05-12 PROCEDURE — 83970 ASSAY OF PARATHORMONE: CPT

## 2017-05-12 PROCEDURE — 36415 COLL VENOUS BLD VENIPUNCTURE: CPT

## 2017-05-13 LAB — ALP BONE SERPL-MCNC: 6.8 UG/L

## 2017-05-16 LAB
DEPRECATED CALCIDIOL+CALCIFEROL SERPL-MC: NORMAL UG/L (ref 20–75)
VITAMIN D2 SERPL-MCNC: <5 UG/L
VITAMIN D3 SERPL-MCNC: 50 UG/L

## 2017-08-17 DIAGNOSIS — C73 PAPILLARY THYROID CARCINOMA (H): ICD-10-CM

## 2017-08-17 DIAGNOSIS — E89.0 POSTSURGICAL HYPOTHYROIDISM: ICD-10-CM

## 2017-08-17 PROCEDURE — 36415 COLL VENOUS BLD VENIPUNCTURE: CPT

## 2017-08-17 PROCEDURE — 84443 ASSAY THYROID STIM HORMONE: CPT

## 2017-08-17 PROCEDURE — 84439 ASSAY OF FREE THYROXINE: CPT

## 2017-08-18 LAB
T4 FREE SERPL-MCNC: 1.29 NG/DL (ref 0.76–1.46)
TSH SERPL DL<=0.005 MIU/L-ACNC: 7.09 MU/L (ref 0.4–4)

## 2017-08-19 ENCOUNTER — HEALTH MAINTENANCE LETTER (OUTPATIENT)
Age: 42
End: 2017-08-19

## 2017-08-22 ASSESSMENT — ENCOUNTER SYMPTOMS
NERVOUS/ANXIOUS: 1
DECREASED CONCENTRATION: 1
PANIC: 0
POLYDIPSIA: 0
INSOMNIA: 0
POLYPHAGIA: 0
ALTERED TEMPERATURE REGULATION: 0
DECREASED APPETITE: 0
DEPRESSION: 0
INCREASED ENERGY: 1
FATIGUE: 1
WEIGHT GAIN: 1
FEVER: 0
CHILLS: 0
HALLUCINATIONS: 0
WEIGHT LOSS: 0

## 2017-08-23 ENCOUNTER — OFFICE VISIT (OUTPATIENT)
Dept: ENDOCRINOLOGY | Facility: CLINIC | Age: 42
End: 2017-08-23

## 2017-08-23 VITALS
HEIGHT: 67 IN | HEART RATE: 76 BPM | WEIGHT: 165 LBS | BODY MASS INDEX: 25.9 KG/M2 | SYSTOLIC BLOOD PRESSURE: 107 MMHG | DIASTOLIC BLOOD PRESSURE: 70 MMHG

## 2017-08-23 DIAGNOSIS — C73 PAPILLARY THYROID CARCINOMA (H): Primary | ICD-10-CM

## 2017-08-23 DIAGNOSIS — M89.9 BONE LESION: ICD-10-CM

## 2017-08-23 DIAGNOSIS — E89.0 POSTSURGICAL HYPOTHYROIDISM: ICD-10-CM

## 2017-08-23 DIAGNOSIS — C73 PAPILLARY THYROID CARCINOMA (H): ICD-10-CM

## 2017-08-23 RX ORDER — LEVOTHYROXINE SODIUM 150 MCG
150 TABLET ORAL DAILY
Qty: 90 TABLET | Refills: 3 | Status: SHIPPED | OUTPATIENT
Start: 2017-08-23 | End: 2018-07-16

## 2017-08-23 ASSESSMENT — PAIN SCALES - GENERAL: PAINLEVEL: NO PAIN (0)

## 2017-08-23 NOTE — PROGRESS NOTES
Endocrine Consult note    Attending Assessment/Plan :     1.  Papillary thyroid carcinoma, unifocal 2.5 cm, isthmus region, LN +  pT2, pN1, pMx, possible cM1 but this is very unclear .  MACIS3.95 best case, and 7.95 worst case if she has distant mets and incomplete resection    I am not confident from review of the path report that the bulky mass seen inferior  thyroid on preop US was removed.    real time office neck US  Today supports this concern.   Formal neck US in radiology.   Refer back to Dr Taylor to discuss 2nd operation. I have counseled her on this issue.     2.  Post surgical hypothyroidism. Labs today. Treat to TSH < 0.4.    Unstable with still higher than target TSH on LT4 125 mcg/day.  Increase to Synthroid 150 mcg/day.  Repeat labs in 2 months.     3.  Abnormal  post therapy TBS performed at [facility]  - as per # 4.    4.  Abnormal bone CT, abnormal bone scan .  A Right sacral ala on bone scan  - sclerotic on CT coronal per my review of images  B) left acetabular on bone scan, sclerotic- - this area is likely too low to correspond to what was seen on 131I .    Late SPECT overlay 131I TBS at [facility] did not show abnormal uptake but the signal was lower by then.  This does not fully exclude the possibility of bone mets.      Katy Bah MD      Cc/ HPI :  Joy presents for follow up of papillary thyroid carcinoma, post surgical hypothyroidism, abnormal bone imaging.  I have seen her once previously on 4/21/17 when she presented post 131I treatment which had occurred at another institution under the direction of another physician.      In November her gynecologist felt a thyroid nodule.  US  showed 3 nodules. FNAB of 2 nodules, (12/19/16)  showed PCT.  She underwent total thyroidectomy 2/6/17 (Dr Espinoza Taylor, [facility]) removing unifocal 2.5 cm Papillary thyroid carcinoma, isthmus with extension into right and left lobes.  3/6 lymph nodes were + for metastatic thyroid cancer.  Background  lymphocytic thyroiditis was seen.     She got  2 dose Thyrogen TBS followed by 131I 101 mCi on 4/13/17 ([facility]).      Synthroid was increased from 112 to 125 mcg/day after our last appt.  Priyank had labs recently which showed no improvement.     We have the following imaging reports:   4/13/17: 123I 6.6 mCi total body scan (Thyrogen stimulated; [facility]) moderate uptake thyroid bed.   4/13/17: 101 mCi 131I  4/21/17 post treatment TBS -raising questions about the left lower quadrant, including left pelvis or GI.      4/19/04: TSH 3.48  1/11/05: TSH 1.27, free T4 1.3  9/27/05: , free T4 1.1  3/8/08: TSH 2.86, free T4 1.1  11/9/10: TSH 2.71  9/1916: TSH 2.31  10/21/16: TPO 1060  2/16/17 Surgery  4/13/17: Tg 0.1, SIMON 210.3  4/13/17: 101 mci 131I  4/21/17: Tg 4.2, SIMON 115, TSH 5.97  8/23/17 : Tg 3.7, SIMON 115 - result followed appt by weeks, not discussed at appt.     images on PACS   4/21/17: post therapy scan ; neck uptake.  I wouldn't have called - tiny focus left abdomen lateral  And also 2 foci midline pelvis- one I would attribute to bladder and the other ?   4/28/17 CT pelvis -sclerotic bone lesions as described by radiologist.do not correspond to likely location of 131I uptake seen on outside TBS . Radiologist states some definitely represent bone islands but some are indeterminant .  5/5/17 bone scan: 2 areas noted by radiologist s- anterior column left acetabulum and right sacral body.      REVIEW OF SYSTEMS  Energy is not 100%  Sleep is OK  Weight is fluctuating more in the last months-  Cardiac: negative  Respiratory: negative  GI: she avoids gluten or she gets very tired with migraine; soy can make her weight go up so she avoids this; she cut dairy - switched to coconut based; following AIP guidelines for weight  Achy sometimes   Still has brain fog  Not running due to lack of energy/ desire - also very busy;   Doesn't get periods attributed to Mirena  Upset the thyroid cancer issue isn't  "resolved-worried for her children that she can't tell them there is no evidence of disease    Past Medical History  Past Medical History:   Diagnosis Date     Papillary thyroid carcinoma (H) 02/06/2017     Post-surgical hypothyroidism 02/06/2017     Past Surgical History:   Procedure Laterality Date     total thyroidectomy  02/06/2017    [facility], Dr Espinoza Taylor       Medications    Current Outpatient Prescriptions   Medication Sig Dispense Refill     cholecalciferol (VITAMIN D3) 5000 UNITS CAPS capsule Take by mouth daily       SYNTHROID 150 MCG tablet Take 1 tablet (150 mcg) by mouth daily 90 tablet 3     levonorgestrel (MIRENA) 20 MCG/24HR IUD        [DISCONTINUED] SYNTHROID 125 MCG tablet Take 1 tablet (125 mcg) by mouth daily 90 tablet 3     She doesn't take the vitamin D daily.    Allergies  No Known Allergies    Family History  family history includes Endometrial Cancer in her mother; Thyroid Disease in her maternal grandmother. There is no history of Thyroid Cancer or DIABETES.     Goiter in maternal great GM;     Social History  Social History   Substance Use Topics     Smoking status: Not on file     Smokeless tobacco: Not on file     Alcohol use Not on file     2 kids, 12 and 10 yrs of ; not back to running again .  ;     Physical Exam  /70  Pulse 76  Ht 1.702 m (5' 7\")  Wt 74.8 kg (165 lb)  BMI 25.84 kg/m2  Body mass index is 25.84 kg/(m^2).  GENERAL :young woman   In no apparent distress  SKIN: Normal color, normal temperature, texture.      EYES: PER, EOMI, No scleral icterus,  No proptosis, conjunctival redness, stare, retraction  NECK: low transverse surgical scar.  No visible masses. I have performed real time neck US. There is a midline left isthmus hypoechoic mass and a left thyroid bed/ level 7 mass, both with blood flow, both suspicious for persistence of disease.   RESP: Lungs clear to auscultation bilaterally  CARDIAC: Regular rate and rhythm, normal S1 S2, without " murmurs, rubs or gallops         NEURO: awake, alert, responds appropriately to questions.   Moves all extremities  No tremor of the outstretched hand.   EXTREMITIES: No clubbing, cyanosis or edema.    DATA REVIEW    Results for RHIANNON SILVA (MRN [id number]) as of 8/26/2017 10:00   Ref. Range 4/21/2017 17:01 5/12/2017 16:44 8/17/2017 16:35   Calcium Latest Ref Range: 8.5 - 10.1 mg/dL 9.1     Phosphorus Latest Ref Range: 2.5 - 4.5 mg/dL  3.5    25 OH Vit D total Latest Ref Range: 20 - 75 ug/L  <55...    25 OH Vit D2 Latest Units: ug/L  <5    25 OH Vit D3 Latest Units: ug/L  50    Bone Spec Alk Phosphatase Unknown  6.8    Calcium Ionized Latest Ref Range: 4.4 - 5.2 mg/dL  5.0    T4 Free Latest Ref Range: 0.76 - 1.46 ng/dL 1.21  1.29   TSH Latest Ref Range: 0.40 - 4.00 mU/L 5.97 (H)  7.09 (H)   Parathyroid Hormone Intact Latest Ref Range: 12 - 72 pg/mL  22    Lab Scanned Result Unknown THYROG & ANTIBODI...       EXAMINATION: CT ABDOMEN PELVIS W/O CONTRAST, 4/28/2017 3:03 PM     TECHNIQUE:  Helical CT images from the lung bases through the pubic  symphysis were obtained  without IV contrast.      COMPARISON: Nuclear medicine post I-131 ablation whole body scan.     HISTORY:  uptake on post therapy scan [facility] 4/21/7.  ? bone vs  pelvic tumor  vs background activity . Please use oral but NO IV  contrast, Malignant neoplasm of thyroid gland, Postprocedural  hypothyroidism.     History of papillary thyroid cancer status post thyroidectomy.  Metastatic carcinoma involving 3 of 6 lymph nodes. On prior whole body  nuclear medicine scan focal increased uptake in the left lower  quadrant of uncertain anatomic position, may be within the GI tract or  perhaps iliac bone. Consider CT of the pelvis.     FINDINGS:  Abdomen and pelvis:   No CT abnormality identified in the location of focal I-131 uptake,  anterior left mid abdomen.      Liver, gallbladder, spleen, pancreas, adrenal glands, kidneys and  bladder are  unremarkable on this limited unenhanced exam. No evidence  of bowel obstruction. Scattered diverticula within the sigmoid colon  without associated inflammatory changes. Appendix is normal.      IUD appears in good positions with arms deployed within the uterine  fundus. No adnexal masses. No ascites. No abdominal or pelvic  lymphadenopathy. Abdominal aorta is normal caliber.   Small  fat-containing a periumbilical hernia.     Lung bases:  Minimal bibasilar atelectasis     Bones and soft tissues: Densely sclerotic lesion in T11 vertebral  body, likely corresponds to bone island. Multiple additional sclerotic  lesions involving bilateral iliac bones, sacrum, left acetabulum and  right femoral head, demonstrating well-defined border, and some with  central lucency. Lesions do not correspond to focal increased I-131  uptake on comparison whole body scan.             IMPRESSION:   1. No CT abnormality identified in the anterior left mid abdomen at  the location of focal I-131 uptake seen on comparison I-131 whole body  scan. Focal increased uptake may have been related to physiologic  bowel uptake, although difficult to be certain without SPECT-CT.   2. Multiple sclerotic lesions involving bilateral pelvic bones, some  of which demonstrate central lucency without corresponding focal  increased I-131 uptake on comparison study. These are unlikely to  represent bone metastasis related to primary thyroid cancer. Some of  these definitely represent bone islands, but some are indeterminant. A  nuclear medicine bone scan would potentially be helpful in confirming  that these are bone islands.     I have personally reviewed the examination and initial interpretation  and I agree with the findings.     JONA Ha, Rebel Jon MD   Mon May 8, 2017  5:00:26 PM CDT         Addendum: The patient returned for additional I-131 imaging with  SPECT-CT of the pelvis and lower abdomen. There is residual activity  within  the bladder from the I-131 study. There is no evidence of  abnormal I-131 within the bones of the pelvis. At this time the renal  pelvis has cleared and there is no abnormal I-131 outside of the  bladder. No bony metastasis are noted within the lower abdomen and  pelvis.     CONRAD MARTINEZ MD       Study Result      EXAMINATION: NM BONE SCAN WHOLE BODY with xSpect imaging  Whole-body bone scan, 5/5/2017 4:11 PM      HISTORY: Malignant neoplasm of thyroid gland      ADDITIONAL INFORMATION: none     COMPARISON: CT of the abdomen and pelvis from 4/28/2017.     TECHNIQUE: The patient received 23.35 mCi of Tc-99m MDP intravenously.  Whole body bone images were obtained at 3 hours. xSPECT images of the  pelvis were obtained for further evaluation.     FINDINGS: The whole-body bone images demonstrates normal distribution  of the radionuclide throughout the osseous structures. Both kidneys  are identified.     Expected images obtained for further clarification of the bony lesions  within the pelvis. The expected images demonstrates minimally  increased radionuclide uptake within the anterior column of the left  acetabulum and the right sacral body bone lesion which is surrounded  by mild lucency. The remainder the osseous structures and sclerotic  lesions do not demonstrate abnormal radionuclide uptake. Thyroid bone  metastasis are normally lytic. Sclerotic nature of the left acetabular  lesion raises the possibility that could be treated metastasis from  the thyroid cancer.         IMPRESSION:   1. Suspicion that at least 2 lesions within the pelvis could be  treated metastasis of the thyroid cancer.  2. The right posterior sacral ala sclerotic lesion is more typical of  an enchondroma.  3. Whole-body bone images do not show any evidence of bony changes  elsewhere within the body to indicate metastatic disease.      CONRAD MARTINEZ MD       Cc: Dr Juan A Taylor

## 2017-08-23 NOTE — LETTER
8/23/2017       RE: Joy Quispe  [address] [place] [address]     Dear Colleague,    Thank you for referring your patient, Joy Quispe, to the [facility] ENDOCRINOLOGY at [facility]. Please see a copy of my visit note below.    Endocrine Consult note    Attending Assessment/Plan :     1.  Papillary thyroid carcinoma, unifocal 2.5 cm, isthmus region, LN +  pT2, pN1, pMx, possible cM1 but this is very unclear .  MACIS3.95 best case, and 7.95 worst case if she has distant mets and incomplete resection    I am not confident from review of the path report that the bulky mass seen inferior  thyroid on preop US was removed.    real time office neck US  Today supports this concern.   Formal neck US in radiology.   Refer back to Dr Taylor to discuss 2nd operation. I have counseled her on this issue.     2.  Post surgical hypothyroidism. Labs today. Treat to TSH < 0.4.    Unstable with still higher than target TSH on LT4 125 mcg/day.  Increase to Synthroid 150 mcg/day.  Repeat labs in 2 months.     3.  Abnormal  post therapy TBS performed at [facility]  - as per # 4.    4.  Abnormal bone CT, abnormal bone scan .  A Right sacral ala on bone scan  - sclerotic on CT coronal per my review of images  B) left acetabular on bone scan, sclerotic- - this area is likely too low to correspond to what was seen on 131I .    Late SPECT overlay 131I TBS at [facility] did not show abnormal uptake but the signal was lower by then.  This does not fully exclude the possibility of bone mets.      Katy Bah MD      Cc/ HPI :  Joy presents for follow up of papillary thyroid carcinoma, post surgical hypothyroidism, abnormal bone imaging.  I have seen her once previously on 4/21/17 when she presented post 131I treatment which had occurred at another institution under the direction of another physician.      In November her gynecologist felt a thyroid nodule.  US  showed 3 nodules. FNAB of 2  nodules, (12/19/16)  showed PCT.  She underwent total thyroidectomy 2/6/17 (Dr Espinoza Taylor, [facility]) removing unifocal 2.5 cm Papillary thyroid carcinoma, isthmus with extension into right and left lobes.  3/6 lymph nodes were + for metastatic thyroid cancer.  Background lymphocytic thyroiditis was seen.     She got  2 dose Thyrogen TBS followed by 131I 101 mCi on 4/13/17 ([facility]).      Synthroid was increased from 112 to 125 mcg/day after our last appt.  Priyank had labs recently which showed no improvement.     We have the following imaging reports:   4/13/17: 123I 6.6 mCi total body scan (Thyrogen stimulated; [facility]) moderate uptake thyroid bed.   4/13/17: 101 mCi 131I  4/21/17 post treatment TBS -raising questions about the left lower quadrant, including left pelvis or GI.  4/21/17 labs following appt Tg 4.2, SIMON 115, TSH 5.97      4/19/04: TSH 3.48  1/11/05: TSH 1.27, free T4 1.3  9/27/05: , free T4 1.1  3/8/08: TSH 2.86, free T4 1.1  11/9/10: TSH 2.71  9/1916: TSH 2.31  10/21/16: TPO 1060  2/16/17 Surgery  4/13/17: Tg 0.1, SIMON 210.3  4/13/17: 101 mci 131I  4/21/17: Tg 4.2, SIMON 115     images on PACS   4/21/17: post therapy scan ; neck uptake.  I wouldn't have called - tiny focus left abdomen lateral  And also 2 foci midline pelvis- one I would attribute to bladder and the other ?   4/28/17 CT pelvis -sclerotic bone lesions as described by radiologist.do not correspond to likely location of 131I uptake seen on outside TBS . Radiologist states some definitely represent bone islands but some are indeterminant .  5/5/17 bone scan: 2 areas noted by radiologist s- anterior column left acetabulum and right sacral body.      REVIEW OF SYSTEMS  Energy is not 100%  Sleep is OK  Weight is fluctuating more in the last months-  Cardiac: negative  Respiratory: negative  GI: she avoids gluten or she gets very tired with migraine; soy can make her weight go up so she avoids this; she cut dairy - switched  "to coconut based; following AIP guidelines for weight  Achy sometimes   Still has brain fog  Not running due to lack of energy/ desire - also very busy;   Doesn't get periods attributed to Mirena  Upset the thyroid cancer issue isn't resolved-worried for her children that she can't tell them there is no evidence of disease    Past Medical History  Past Medical History:   Diagnosis Date     Papillary thyroid carcinoma (H) 02/06/2017     Post-surgical hypothyroidism 02/06/2017     Past Surgical History:   Procedure Laterality Date     total thyroidectomy  02/06/2017    [facility], Dr Espinoza Taylor       Medications    Current Outpatient Prescriptions   Medication Sig Dispense Refill     cholecalciferol (VITAMIN D3) 5000 UNITS CAPS capsule Take by mouth daily       SYNTHROID 150 MCG tablet Take 1 tablet (150 mcg) by mouth daily 90 tablet 3     levonorgestrel (MIRENA) 20 MCG/24HR IUD        [DISCONTINUED] SYNTHROID 125 MCG tablet Take 1 tablet (125 mcg) by mouth daily 90 tablet 3     She doesn't take the vitamin D daily.    Allergies  No Known Allergies    Family History  family history includes Endometrial Cancer in her mother; Thyroid Disease in her maternal grandmother. There is no history of Thyroid Cancer or DIABETES.     Goiter in maternal great GM;     Social History  Social History   Substance Use Topics     Smoking status: Not on file     Smokeless tobacco: Not on file     Alcohol use Not on file     2 kids, 12 and 10 yrs of ; not back to running again .  ;     Physical Exam  /70  Pulse 76  Ht 1.702 m (5' 7\")  Wt 74.8 kg (165 lb)  BMI 25.84 kg/m2  Body mass index is 25.84 kg/(m^2).  GENERAL :young woman   In no apparent distress  SKIN: Normal color, normal temperature, texture.      EYES: PER, EOMI, No scleral icterus,  No proptosis, conjunctival redness, stare, retraction  NECK: low transverse surgical scar.  No visible masses. I have performed real time neck US. There is a midline left " isthmus hypoechoic mass and a left thyroid bed/ level 7 mass, both with blood flow, both suspicious for persistence of disease.   RESP: Lungs clear to auscultation bilaterally  CARDIAC: Regular rate and rhythm, normal S1 S2, without murmurs, rubs or gallops         NEURO: awake, alert, responds appropriately to questions.   Moves all extremities  No tremor of the outstretched hand.   EXTREMITIES: No clubbing, cyanosis or edema.    DATA REVIEW    Results for RHIANNON SILVA (MRN [id number]) as of 8/26/2017 10:00   Ref. Range 4/21/2017 17:01 5/12/2017 16:44 8/17/2017 16:35   Calcium Latest Ref Range: 8.5 - 10.1 mg/dL 9.1     Phosphorus Latest Ref Range: 2.5 - 4.5 mg/dL  3.5    25 OH Vit D total Latest Ref Range: 20 - 75 ug/L  <55...    25 OH Vit D2 Latest Units: ug/L  <5    25 OH Vit D3 Latest Units: ug/L  50    Bone Spec Alk Phosphatase Unknown  6.8    Calcium Ionized Latest Ref Range: 4.4 - 5.2 mg/dL  5.0    T4 Free Latest Ref Range: 0.76 - 1.46 ng/dL 1.21  1.29   TSH Latest Ref Range: 0.40 - 4.00 mU/L 5.97 (H)  7.09 (H)   Parathyroid Hormone Intact Latest Ref Range: 12 - 72 pg/mL  22    Lab Scanned Result Unknown THYROG & ANTIBODI...       EXAMINATION: CT ABDOMEN PELVIS W/O CONTRAST, 4/28/2017 3:03 PM     TECHNIQUE:  Helical CT images from the lung bases through the pubic  symphysis were obtained  without IV contrast.      COMPARISON: Nuclear medicine post I-131 ablation whole body scan.     HISTORY:  uptake on post therapy scan [facility] 4/21/7.  ? bone vs  pelvic tumor  vs background activity . Please use oral but NO IV  contrast, Malignant neoplasm of thyroid gland, Postprocedural  hypothyroidism.     History of papillary thyroid cancer status post thyroidectomy.  Metastatic carcinoma involving 3 of 6 lymph nodes. On prior whole body  nuclear medicine scan focal increased uptake in the left lower  quadrant of uncertain anatomic position, may be within the GI tract or  perhaps iliac bone. Consider CT of the  pelvis.     FINDINGS:  Abdomen and pelvis:   No CT abnormality identified in the location of focal I-131 uptake,  anterior left mid abdomen.      Liver, gallbladder, spleen, pancreas, adrenal glands, kidneys and  bladder are unremarkable on this limited unenhanced exam. No evidence  of bowel obstruction. Scattered diverticula within the sigmoid colon  without associated inflammatory changes. Appendix is normal.      IUD appears in good positions with arms deployed within the uterine  fundus. No adnexal masses. No ascites. No abdominal or pelvic  lymphadenopathy. Abdominal aorta is normal caliber.   Small  fat-containing a periumbilical hernia.     Lung bases:  Minimal bibasilar atelectasis     Bones and soft tissues: Densely sclerotic lesion in T11 vertebral  body, likely corresponds to bone island. Multiple additional sclerotic  lesions involving bilateral iliac bones, sacrum, left acetabulum and  right femoral head, demonstrating well-defined border, and some with  central lucency. Lesions do not correspond to focal increased I-131  uptake on comparison whole body scan.             IMPRESSION:   1. No CT abnormality identified in the anterior left mid abdomen at  the location of focal I-131 uptake seen on comparison I-131 whole body  scan. Focal increased uptake may have been related to physiologic  bowel uptake, although difficult to be certain without SPECT-CT.   2. Multiple sclerotic lesions involving bilateral pelvic bones, some  of which demonstrate central lucency without corresponding focal  increased I-131 uptake on comparison study. These are unlikely to  represent bone metastasis related to primary thyroid cancer. Some of  these definitely represent bone islands, but some are indeterminant. A  nuclear medicine bone scan would potentially be helpful in confirming  that these are bone islands.     I have personally reviewed the examination and initial interpretation  and I agree with the  findings.     JONA Ha, Rebel Jon MD   Mon May 8, 2017  5:00:26 PM CDT         Addendum: The patient returned for additional I-131 imaging with  SPECT-CT of the pelvis and lower abdomen. There is residual activity  within the bladder from the I-131 study. There is no evidence of  abnormal I-131 within the bones of the pelvis. At this time the renal  pelvis has cleared and there is no abnormal I-131 outside of the  bladder. No bony metastasis are noted within the lower abdomen and  pelvis.     REBEL HA MD       Study Result      EXAMINATION: NM BONE SCAN WHOLE BODY with xSpect imaging  Whole-body bone scan, 5/5/2017 4:11 PM      HISTORY: Malignant neoplasm of thyroid gland      ADDITIONAL INFORMATION: none     COMPARISON: CT of the abdomen and pelvis from 4/28/2017.     TECHNIQUE: The patient received 23.35 mCi of Tc-99m MDP intravenously.  Whole body bone images were obtained at 3 hours. xSPECT images of the  pelvis were obtained for further evaluation.     FINDINGS: The whole-body bone images demonstrates normal distribution  of the radionuclide throughout the osseous structures. Both kidneys  are identified.     Expected images obtained for further clarification of the bony lesions  within the pelvis. The expected images demonstrates minimally  increased radionuclide uptake within the anterior column of the left  acetabulum and the right sacral body bone lesion which is surrounded  by mild lucency. The remainder the osseous structures and sclerotic  lesions do not demonstrate abnormal radionuclide uptake. Thyroid bone  metastasis are normally lytic. Sclerotic nature of the left acetabular  lesion raises the possibility that could be treated metastasis from  the thyroid cancer.         IMPRESSION:   1. Suspicion that at least 2 lesions within the pelvis could be  treated metastasis of the thyroid cancer.  2. The right posterior sacral ala sclerotic lesion is more typical of  an  enchondroma.  3. Whole-body bone images do not show any evidence of bony changes  elsewhere within the body to indicate metastatic disease.      CONRAD MARTINEZ MD       CC: Dr Juan A Taylor

## 2017-08-23 NOTE — NURSING NOTE
Chief Complaint   Patient presents with     RECHECK     F/U HASHIMOTO'NEREYDA Hernandez, [facility]  Endocrinology & Diabetes 3G

## 2017-08-23 NOTE — MR AVS SNAPSHOT
After Visit Summary   8/23/2017    Joy Quispe    MRN: [id number]           Patient Information     Date Of Birth          1975        Visit Information        Provider Department      8/23/2017 2:30 PM Katy Bah MD [facility]        Today's Diagnoses     Papillary thyroid carcinoma (H)    -  1    Postsurgical hypothyroidism        Bone lesion          Care Instructions    Get labs today  You should have labs to follow up on the Synthroid dose change in about 2 months.  I am leaving orders for follow up labs on the medical record.  Please call [phone]  to schedule lab follow up testing as directed.  Alternatively, it can be drawn in any [place] lab.      Get neck US for baseline.    Schedule follow up with Dr Taylor to discuss possible 2nd operation.                    Follow-ups after your visit        Your next 10 appointments already scheduled     Aug 23, 2017  3:15 PM CDT   LAB with [facility] ([facility])    [address] [place] [id number] [phone]           Patient must bring picture ID. Patient should be prepared to give a urine specimen  Please do not eat 10-12 hours before your appointment if you are coming in fasting for labs on lipids, cholesterol, or glucose (sugar). Pregnant women should follow their Care Team instructions. Water with medications is okay. Do not drink coffee or other fluids. If you have concerns about taking  your medications, please ask at office or if scheduling via [employer], send a message by clicking on Secure Messaging, Message Your Care Team.            Aug 28, 2017 12:15 PM CDT   US HEAD NECK SOFT TISSUE with [facility] ([facility])    [address] [place] [address] [phone]           Please bring a list of your medicines (including vitamins, minerals and over-the-counter drugs).  Also, tell your doctor about any allergies you may have. Wear comfortable clothes and leave your valuables at home.  You do not need to do anything special to prepare for your exam.  Please call the Imaging Department at your exam site with any questions.              Future tests that were ordered for you today     Open Future Orders        Priority Expected Expires Ordered    US head neck soft tissue Routine  3/21/2018 8/23/2017    TSH Routine 10/23/2017 8/23/2018 8/23/2017    T4 free Routine 10/23/2017 8/23/2018 8/23/2017    Thyroglobulin (Total, antibody & recovery %) Routine  8/23/2018 8/23/2017            Who to contact     Please call your clinic at [phone] to:    Ask questions about your health    Make or cancel appointments    Discuss your medicines    Learn about your test results    Speak to your doctor   If you have compliments or concerns about an experience at your clinic, or if you wish to file a complaint, please contact [facility] Patient Relations at [phone] or email us at [email or web address]         Additional Information About Your Visit        Adjacent Applicationshart Information     [employer] gives you secure access to your electronic health record. If you see a primary care provider, you can also send messages to your care team and make appointments. If you have questions, please call your primary care clinic.  If you do not have a primary care provider, please call [phone] and they will assist you.      [employer] is an electronic gateway that provides easy, online access to your medical records. With [employer], you can request a clinic appointment, read your test results, renew a prescription or communicate with your care team.     To access your existing account, please contact your [facility] or call [phone] for assistance.        Care EveryWhere ID     This is your Care EveryWhere ID. This could be used by other  "organizations to access your [facility] medical records  [id number]        Your Vitals Were     Pulse Height BMI (Body Mass Index)             76 1.702 m (5' 7\") 25.84 kg/m2          Blood Pressure from Last 3 Encounters:   08/23/17 107/70   04/21/17 130/70    Weight from Last 3 Encounters:   08/23/17 74.8 kg (165 lb)   04/21/17 74.4 kg (164 lb 1.6 oz)                 Today's Medication Changes          These changes are accurate as of: 8/23/17  3:10 PM.  If you have any questions, ask your nurse or doctor.               Start taking these medicines.        Dose/Directions    SYNTHROID 150 MCG tablet   Used for:  Papillary thyroid carcinoma (H), Postsurgical hypothyroidism, Bone lesion   Generic drug:  levothyroxine   Replaces:  SYNTHROID 125 MCG tablet   Started by:  Katy Bah MD        Dose:  150 mcg   Take 1 tablet (150 mcg) by mouth daily   Quantity:  90 tablet   Refills:  3         Stop taking these medicines if you haven't already. Please contact your care team if you have questions.     SYNTHROID 125 MCG tablet   Generic drug:  levothyroxine   Replaced by:  SYNTHROID 150 MCG tablet   Stopped by:  Katy Bah MD           Vitamin D3 280352 UNIT/GM Powd   Stopped by:  Katy Bah MD                Where to get your medicines      These medications were sent to [facility]/pharmacy #[id number] [place] [address] [address]     Phone:  [phone]     SYNTHROID 150 MCG tablet                Primary Care Provider Office Phone # Fax #    Valeri Field [phone]       [facility] [address]  [facility] [address]        Equal Access to Services     [facility] AH: Hadii chandan Menon, wajoaquínda luqadaha, qaybta kaalmada adeegyada, munir new. So [facility] [phone].    ATENCIÓN: Si habla español, tiene a royal disposición servicios gratuitos de asistencia lingüística. " Todd ramírez [phone].    We comply with applicable federal civil rights laws and [place] laws. We do not discriminate on the basis of race, color, national origin, age, disability sex, sexual orientation or gender identity.            Thank you!     Thank you for choosing [facility]  for your care. Our goal is always to provide you with excellent care. Hearing back from our patients is one way we can continue to improve our services. Please take a few minutes to complete the written survey that you may receive in the mail after your visit with us. Thank you!             Your Updated Medication List - Protect others around you: Learn how to safely use, store and throw away your medicines at www.disposemymeds.org.          This list is accurate as of: 8/23/17  3:10 PM.  Always use your most recent med list.                   Brand Name Dispense Instructions for use Diagnosis    cholecalciferol 5000 UNITS Caps capsule    vitamin D3     Take by mouth daily    Papillary thyroid carcinoma (H), Postsurgical hypothyroidism, Bone lesion       levonorgestrel 20 MCG/24HR IUD    MIRENA      Papillary thyroid carcinoma (H), Postsurgical hypothyroidism       SYNTHROID 150 MCG tablet   Generic drug:  levothyroxine     90 tablet    Take 1 tablet (150 mcg) by mouth daily    Papillary thyroid carcinoma (H), Postsurgical hypothyroidism, Bone lesion

## 2017-08-23 NOTE — PATIENT INSTRUCTIONS
Get labs today  You should have labs to follow up on the Synthroid dose change in about 2 months.  I am leaving orders for follow up labs on the medical record.  Please call [phone]  to schedule lab follow up testing as directed.  Alternatively, it can be drawn in any [facility] lab.      Get neck US for baseline.    Schedule follow up with Dr Taylor to discuss possible 2nd operation.

## 2017-08-28 NOTE — PROGRESS NOTES
I have reviewed the images from the 8/28/17.  I see the abnormal masses left level 6 and 7 as described.      Katy Bah MD    EXAMINATION: US HEAD NECK SOFT TISSUE, 8/28/2017 1:20 PM      COMPARISON: None available     HISTORY: Patient with history of papillary thyroid carcinoma status  post thyroidectomy and I-131 with abnormal post therapy TBS. Patient  has abnormal bone CT and bone scan.     FINDINGS:     Lymph nodes are measured bilaterally with measurements given in  craniocaudal, transverse and AP dimensions as follows:     Right:  Level 2: No enlarged lymph nodes.  Level 3: No enlarged lymph nodes.     Level 4: No enlarged lymph nodes.  Level 5: No enlarged lymph nodes.  Level 6: 0.5 x 0.5 x 1.1 cm nodule in level 6 does not definitely have  fatty hilum.  Level 7: No enlarged lymph nodes.     Left:  Level 2: 1.4 x 0.6 x 1.1 cm lymph node with preservation of fatty  hilum and reniform shape.  Level 3: No enlarged lymph nodes.  Level 4: No enlarged lymph nodes.  Level 5: 0.6 x 0.7 x 0.9 centimeter nodule with possible fatty hilum,  loss of reniform shape.  Level 6:   #1: 0.6 x 0.3 x 0.6 cm nodule. No appreciable fatty hilum and loss of  reniform shape.  #2: 0.4 x 0.2 x 0.5 cm nodule. No appreciable fatty hilum and loss of  reniform shape.  #3: 0.5 x 0.7 x 0.5 cm nodule. No appreciable fatty hilum and loss of  reniform shape.  #4: 1.2 x 1.5 x 2.8 cm nodule. No appreciable fatty hilum and loss of  reniform shape.  #5 0.5 x 1 x 1 cm nodule. No appreciable fatty hilum and loss of  reniform shape.  level 7:  1.5 x 1.5 x 1 cm nodule. No appreciable fatty hilum and loss  of reniform shape.         IMPRESSION:  In this patient with history of papillary thyroid carcinoma, status  post thyroidectomy and I-131 therapy, there are multiple indeterminant  nodules at the left levels 6 and 7 that demonstrate suspicious  morphology. Recommend further characterization with tissue sampling.     I have personally reviewed  the examination and initial interpretation  and I agree with the findings.     REJI CARDOZO MD      EXAMINATION: US BIOPSY FINE NEEDLE ASPIRATION LYMPH NODE, 9/13/2017  10:49 AM     COMPARISON: Ultrasound 8/28/2017     HISTORY: Patient with history of papillary thyroid carcinoma status  post thyroidectomy and I-131. Abnormal posttherapy TBS. Recent  ultrasound showed suspicious enlarged lymph nodes. Fine-needle  aspiration request.      FINDINGS: After obtaining informed consent, patient was sterilely  prepped and draped. 1% lidocaine was administered for local  anesthetic.  Using ultrasound guidance, the following aspirates were  obtained:     1 -  4  fine-needle aspirates were obtained of the largest left level  6 lymph node labeled as #4 on last ultrasound examination  2 -  5  fine-needle aspirates were obtained of the midline lower neck  lymph node labeled as #1 on last ultrasound examination.      Procedure was well tolerated. There were no complications. Pathology  was present and confirmed sample adequacy.         IMPRESSION: Uncomplicated fine-needle aspiration of 2 neck lymph nodes  as described above.     I have personally reviewed the examination and initial interpretation  and I agree with the findings.     RICCO DALE MD    Copath Report 09/13/2017 10:40 AM 88      Patient Name: RHIANNON SILVA   MR#: [id number]   Specimen #: [id number]   Collected: 9/13/2017   Received: 9/13/2017   Reported: 9/14/2017 17:53   Ordering Phy(s): HANK ROBERTO     For improved result formatting, select 'View Enhanced Report Format'   under Linked Documents section.     SPECIMEN/STAIN PROCESS:   A: FNA-lymph node, Midline #1        Pap-Cyto x 3, Diff Quick Stain-cyto x 3   B: FNA-lymph node, Left Level 6 #4        Pap-Cyto x 1, Diff Quick Stain-cyto x 4     ----------------------------------------------------------------     CYTOLOGIC INTERPRETATION:     A.  FNA-lymph node, Midline #1:   - Positive for malignancy    - Consistent with papillary thyroid carcinoma   Specimen Adequacy: Satisfactory for evaluation.     B.  FNA-lymph node, Left Level 6 #4:   - Positive for malignancy   - Consistent with papillary thyroid carcinoma   Specimen Adequacy: Satisfactory for evaluation.     I have personally reviewed all specimens and/or slides, including the   listed special stains, and used them with my medical judgement to   determine or confirm the final diagnosis.     Electronically signed out by:   Rea Miguel M.D., Physicians     Processed and screened at [facility]     CLINICAL HISTORY:   42-year-old female with a history of papillary thyroid carcinoma s/p   radioactive iodine and total thyroidectomy (2/6/17), with 3/6 lymph   nodes positive for metastases, now indeterminant lymph nodes seen on   head/neck ultrasound.     ,     GROSS:   A. FNA-lymph node, Midline #1:  Received are 3 fixed slides, processed   for Pap stain, and 3 air dried slides, processed for Diff Quik stain.   Needle washes into saline were performed and sent directly from   Radiology for thyroglobulin.  Material in RPMI held for future studies.     B. FNA-lymph node, Left Level 6 #4:  Received are 1 fixed slide,   processed for Pap stain, and 4 air dried slides, processed for Diff Quik   stain. Needle washes into saline were performed and sent directly from   Radiology for thyroglobulin assay.  Material in RPMI held for future   studies.     INTRAOPERATIVE CONSULTATION:   FNA Performance: Fine needle aspiration was not performed by [facility],    Pathology staff.   Immediate Adequacy: On site specimen adequacy evaluation was performed   by Dr. KADIE Kumar MD via telepathology.     Onsite adequacy/interpretation:   Pass A1 adequate.  Pass A2 put directly into saline.  Pass A3-A4   adequate.   Pass B1 inadequate.  Pass B2 put directly into saline.  Pass B3-B5   adequate.     MICROSCOPIC:   Microscopic  examination is performed.     Chelsea Perry MD, Pathology Resident; Fatou Monique MD, Cytopathology   Fellow; Rea Miguel MD.     CPT Codes:   A: 54655-FAUO-ACR, 08887-SXJO-SPB, 31564-BUXT   B: 37018-WYEF-BRU, 30379-CJJR-YCG, 35610-EIMW     TESTING LAB LOCATION:   [facility], [address]   [id number] [phone]     COLLECTION SITE:   Client:  [facility]   Location:  CUS (B)     Resident   [id number]                9/13/17 needle wash Tg 43  Needle wash Tg 7.8

## 2017-08-29 DIAGNOSIS — C73 PAPILLARY THYROID CARCINOMA (H): ICD-10-CM

## 2017-08-29 DIAGNOSIS — R59.0 CERVICAL ADENOPATHY: Primary | ICD-10-CM

## 2017-09-11 ENCOUNTER — PRE VISIT (OUTPATIENT)
Dept: OTOLARYNGOLOGY | Facility: CLINIC | Age: 42
End: 2017-09-11

## 2017-09-11 NOTE — TELEPHONE ENCOUNTER
1.  Date/reason for appt: 10/2/17 -- surgery consult    2.  Referring provider: Katy Bah    3.  Call to patient (Yes / No - short description): no, referred    4.  Previous care at / records requested from: [facility] -- records and imaging in epic/pacs

## 2017-09-15 ENCOUNTER — TELEPHONE (OUTPATIENT)
Dept: ENDOCRINOLOGY | Facility: CLINIC | Age: 42
End: 2017-09-15

## 2017-09-15 LAB — LAB SCANNED RESULT: ABNORMAL

## 2017-09-15 NOTE — TELEPHONE ENCOUNTER
Phone conversation with Joy. I have informed her of the recent biopsy results, which was not a surprise to either of us.  She is OK with the current timeline for appt with Dr Cordova.    Katy Bah MD

## 2017-10-02 ENCOUNTER — OFFICE VISIT (OUTPATIENT)
Dept: OTOLARYNGOLOGY | Facility: CLINIC | Age: 42
End: 2017-10-02

## 2017-10-02 VITALS — HEIGHT: 68 IN | BODY MASS INDEX: 25.31 KG/M2 | WEIGHT: 167 LBS

## 2017-10-02 DIAGNOSIS — E89.0 POSTSURGICAL HYPOTHYROIDISM: ICD-10-CM

## 2017-10-02 DIAGNOSIS — M89.9 BONE LESION: ICD-10-CM

## 2017-10-02 DIAGNOSIS — C73 PAPILLARY THYROID CARCINOMA (H): ICD-10-CM

## 2017-10-02 DIAGNOSIS — C73 MALIGNANT NEOPLASM OF THYROID GLAND (H): Primary | ICD-10-CM

## 2017-10-02 LAB
T4 FREE SERPL-MCNC: 1.45 NG/DL (ref 0.76–1.46)
TSH SERPL DL<=0.005 MIU/L-ACNC: 0.9 MU/L (ref 0.4–4)

## 2017-10-02 ASSESSMENT — PAIN SCALES - GENERAL: PAINLEVEL: NO PAIN (0)

## 2017-10-02 NOTE — NURSING NOTE
Relevant Diagnosis:thyroid cancer  Teaching Topic: Re-operative central neck dissection  Person(s) involved in teaching: Patient/spouse     Teaching Concerns Addressed:  Pre op teaching included the need for an H&P, NPO status pre op, hospital routines, expected recovery, activity  restrictions, antimicrobial scrub, s/s of infection, pain control methods and the importance of follow up appointments.  The patient voiced an understanding of all instructions and will call with questions.     Motivation Level:  Asks Questions:   Yes  Eager to Learn:   Yes  Cooperative:   Yes  Receptive (willing/able to accept information):   Yes     Patient  demonstrates understanding of the following:  Reason for the appointment, diagnosis and treatment plan:   Yes  Knowledge of proper use of medications and conditions for which they are ordered (with special attention to potential side effects or drug interactions):   Yes  Which situations necessitate calling provider and whom to contact:   Yes        Proper use and care of  (medical equip, care aids, etc.):   NA  Nutritional needs and diet plan:   Yes  Pain management techniques:   Yes  Patient instructed on hand hygiene:  Yes  How and/when to access community resources:   NA     Infection Prevention:  Patient   demonstrates understanding of the following:  Surgical procedure site care taught   Signs and symptoms of infection taught Yes  Wound care taught Yes     Instructional Materials Used/Given: Pre op booklet.

## 2017-10-02 NOTE — LETTER
10/2/2017       RE: Joy Quispe  [address] [place] [address]     Dear Colleague,    Thank you for referring your patient, Joy Quispe, to the [facility] at [facility]. Please see a copy of my visit note below.    REASON FOR CONSULTATION:  I was asked by Dr. Katy Bah to see this patient for surgical options for papillary thyroid carcinoma.      HISTORY OF PRESENT ILLNESS:  This is a patient who was noted to have a thyroid nodule by her gynecologist.  Ultrasound and biopsy confirmed papillary thyroid carcinoma.  She proceeded with a total thyroidectomy by Dr. [facility] at [facility] in 2017.  There was a unifocal papillary thyroid carcinoma in the isthmus that extended to both sides.  Three of 6 lymph nodes were positive for metastatic disease.  The patient did receive radioactive iodine treatment in 04/2017.  Her current levothyroxine dose is 125 mcg.  Recently she was noted to have an elevating thyroglobulin at 3.7 with antithyroglobulin antibody to 115.  This was in 08/2017.  This then led to an ultrasound of the central neck that identified a right level 6 node 1.1 cm in size and several left level 7 nodes that had size and characteristic criteria of concern.  She then underwent a fine needle aspiration biopsy of the midline and left level 6 neck dissection.  Both of these were consistent with papillary thyroid carcinoma.      PAST MEDICAL HISTORY, SURGICAL HISTORY AND MEDICATIONS:  Have been reviewed and are available in the EMR.      REVIEW OF SYSTEMS:  A 10-point review of systems is pertinent for that noted in the HPI.      PHYSICAL EXAMINATION:  She has a well-healed curvilinear incisional scar over the anterior neck.  There is no palpable cervical lymphadenopathy.      ASSESSMENT: Metastatic papillary thyroid carcinoma.      PLAN:  I should note that the patient denies any problems with voice quality, inspiration or  swallowing following her surgery.  However, at this point the patient chose to not have a flexible laryngoscopy at this setting.  I recommend that her vocal cords be evaluated in the preoperative area prior to her reoperative central neck dissection.  This then led to a further discussion of the procedure with a reoperative central neck dissection.  There are significant increased risks in a reoperative case, specifically increased risk to one or both recurrent laryngeal nerves resulting in possible loss of airway, possible hypocalcemia.  I doubt that the patient will require a drain postoperatively.  I believe that this can be done as an outpatient procedure.      At the conclusion of our meeting, the patient felt that all of her questions and concerns were addressed to her satisfaction.  We will schedule her for surgery accordingly.         JESS MARQUEZ MD             D: 10/13/2017 12:28   T: 10/13/2017 15:44   MT: ASHLEY      Name:     RHIANNON SILVA   MRN:      [id number]        Account:      [id number]   :      1975           Service Date: 10/02/2017      Document: [id number]

## 2017-10-02 NOTE — MR AVS SNAPSHOT
After Visit Summary   10/2/2017    Joy Quispe    MRN: [id number]           Patient Information     Date Of Birth          1975        Visit Information        Provider Department      10/2/2017 4:00 PM Yanet Cordova MD M [facility] Ear Nose and Throat        Today's Diagnoses     Malignant neoplasm of thyroid gland (H)    -  1      Care Instructions    Nurse teaching given on Re-operative central neck dissection and the patient expresses understanding and acceptance of instructions. Srinath Machuca 10/2/2017 4:55 PM            Follow-ups after your visit        Your next 10 appointments already scheduled     Nov 06, 2017  8:00 AM CST   (Arrive by 7:45 AM)   PAC PHONE RN ASSESSMENT with  Pac Rn   [facility] ([facility] [place])    [address] [place] [address] [phone]           Note: this is not an onsite visit; there is no need to come to the facility.            Nov 08, 2017   Procedure with Yanet Cordova MD   [facility] [place], Same Day Surgery (--)    [address] [facility] [id number] [phone]            Nov 20, 2017  3:30 PM CST   (Arrive by 3:15 PM)   Return Visit with Yanet Cordova MD   [facility] Ear Nose and Throat ([facility] [place])    [address] [place] [address] [phone]              Who to contact     Please call your clinic at [phone] to:    Ask questions about your health    Make or cancel appointments    Discuss your medicines    Learn about your test results    Speak to your doctor   If you have compliments or concerns about an experience at your clinic, or if you wish to file a complaint, please contact [facility] Patient Relations at [phone] or email us at [email or web address]         Additional Information About Your Visit        MyChart Information     [employer] gives you  "secure access to your electronic health record. If you see a primary care provider, you can also send messages to your care team and make appointments. If you have questions, please call your primary care clinic.  If you do not have a primary care provider, please call [phone] and they will assist you.      [employer] is an electronic gateway that provides easy, online access to your medical records. With [employer] you can request a clinic appointment, read your test results, renew a prescription or communicate with your care team.     To access your existing account, please contact your [facility] or call [phone] for assistance.        Care EveryWhere ID     This is your Care EveryWhere ID. This could be used by other organizations to access your [facility] medical records  [id number]        Your Vitals Were     Height BMI (Body Mass Index)                1.715 m (5' 7.5\") 25.77 kg/m2           Blood Pressure from Last 3 Encounters:   08/23/17 107/70   04/21/17 130/70    Weight from Last 3 Encounters:   10/02/17 75.8 kg (167 lb)   08/23/17 74.8 kg (165 lb)   04/21/17 74.4 kg (164 lb 1.6 oz)              We Performed the Following     Charo-Operative Worksheet        Primary Care Provider Office Phone # Fax #    Valeri Field [phone]       [facility] [address] [facility] [address]  [facility] [address]        Equal Access to Services     [facility] Center[facility]ONELLA : Hadii aad ku hadasho Soomaali, waaxda luqadaha, qaybta kaalmada adeegyada, munir crawford . So [facility] [phone].    ATENCIÓN: Si habla español, tiene a royal disposición servicios gratuitos de asistencia lingüística. Llame al [phone].    We comply with applicable federal civil rights laws and [place] laws. We do not discriminate on the basis of race, color, national origin, age, disability, sex, sexual orientation, or gender identity.            Thank you!     Thank you for choosing M " [facility] EAR NOSE AND THROAT  for your care. Our goal is always to provide you with excellent care. Hearing back from our patients is one way we can continue to improve our services. Please take a few minutes to complete the written survey that you may receive in the mail after your visit with us. Thank you!             Your Updated Medication List - Protect others around you: Learn how to safely use, store and throw away your medicines at www.disposemymeds.org.          This list is accurate as of: 10/2/17 11:59 PM.  Always use your most recent med list.                   Brand Name Dispense Instructions for use Diagnosis    cholecalciferol 5000 UNITS Caps capsule    vitamin D3     Take by mouth daily    Papillary thyroid carcinoma (H), Postsurgical hypothyroidism, Bone lesion       levonorgestrel 20 MCG/24HR IUD    MIRENA      Papillary thyroid carcinoma (H), Postsurgical hypothyroidism       SYNTHROID 150 MCG tablet   Generic drug:  levothyroxine     90 tablet    Take 1 tablet (150 mcg) by mouth daily    Papillary thyroid carcinoma (H), Postsurgical hypothyroidism, Bone lesion

## 2017-10-02 NOTE — NURSING NOTE
Chief Complaint   Patient presents with     Consult     surgery consult     Jenny Sweeney Medical Assistant

## 2017-10-02 NOTE — PATIENT INSTRUCTIONS
Nurse teaching given on Re-operative central neck dissection and the patient expresses understanding and acceptance of instructions. Srinath Machuca 10/2/2017 4:55 PM

## 2017-10-04 DIAGNOSIS — E89.0 POSTSURGICAL HYPOTHYROIDISM: ICD-10-CM

## 2017-10-04 DIAGNOSIS — C73 PAPILLARY THYROID CARCINOMA (H): Primary | ICD-10-CM

## 2017-10-13 NOTE — PROGRESS NOTES
REASON FOR CONSULTATION:  I was asked by Dr. Katy Bah to see this patient for surgical options for papillary thyroid carcinoma.      HISTORY OF PRESENT ILLNESS:  This is a patient who was noted to have a thyroid nodule by her gynecologist.  Ultrasound and biopsy confirmed papillary thyroid carcinoma.  She proceeded with a total thyroidectomy by Dr. Taylor at [facility] in 2017.  There was a unifocal papillary thyroid carcinoma in the isthmus that extended to both sides.  Three of 6 lymph nodes were positive for metastatic disease.  The patient did receive radioactive iodine treatment in 04/2017.  Her current levothyroxine dose is 125 mcg.  Recently she was noted to have an elevating thyroglobulin at 3.7 with antithyroglobulin antibody to 115.  This was in 08/2017.  This then led to an ultrasound of the central neck that identified a right level 6 node 1.1 cm in size and several left level 7 nodes that had size and characteristic criteria of concern.  She then underwent a fine needle aspiration biopsy of the midline and left level 6 neck dissection.  Both of these were consistent with papillary thyroid carcinoma.      PAST MEDICAL HISTORY, SURGICAL HISTORY AND MEDICATIONS:  Have been reviewed and are available in the EMR.      REVIEW OF SYSTEMS:  A 10-point review of systems is pertinent for that noted in the HPI.      PHYSICAL EXAMINATION:  She has a well-healed curvilinear incisional scar over the anterior neck.  There is no palpable cervical lymphadenopathy.      ASSESSMENT: Metastatic papillary thyroid carcinoma.      PLAN:  I should note that the patient denies any problems with voice quality, inspiration or swallowing following her surgery.  However, at this point the patient chose to not have a flexible laryngoscopy at this setting.  I recommend that her vocal cords be evaluated in the preoperative area prior to her reoperative central neck dissection.  This then led to a further  discussion of the procedure with a reoperative central neck dissection.  There are significant increased risks in a reoperative case, specifically increased risk to one or both recurrent laryngeal nerves resulting in possible loss of airway, possible hypocalcemia.  I doubt that the patient will require a drain postoperatively.  I believe that this can be done as an outpatient procedure.      At the conclusion of our meeting, the patient felt that all of her questions and concerns were addressed to her satisfaction.  We will schedule her for surgery accordingly.         JESS MARQUEZ MD             D: 10/13/2017 12:28   T: 10/13/2017 15:44   MT: ASHLEY      Name:     RHIANNON SILVA   MRN:      [id number]        Account:      [id number]   :      1975           Service Date: 10/02/2017      Document: [id number]

## 2017-11-06 ENCOUNTER — APPOINTMENT (OUTPATIENT)
Dept: SURGERY | Facility: CLINIC | Age: 42
End: 2017-11-06

## 2017-11-07 ENCOUNTER — ANESTHESIA EVENT (OUTPATIENT)
Dept: SURGERY | Facility: CLINIC | Age: 42
End: 2017-11-07
Payer: COMMERCIAL

## 2017-11-08 ENCOUNTER — ANESTHESIA (OUTPATIENT)
Dept: SURGERY | Facility: CLINIC | Age: 42
End: 2017-11-08
Payer: COMMERCIAL

## 2017-11-08 ENCOUNTER — HOSPITAL ENCOUNTER (OUTPATIENT)
Facility: CLINIC | Age: 42
Discharge: HOME | End: 2017-11-08
Attending: SURGERY | Admitting: SURGERY
Payer: COMMERCIAL

## 2017-11-08 VITALS
OXYGEN SATURATION: 95 % | TEMPERATURE: 98.3 F | WEIGHT: 163.14 LBS | RESPIRATION RATE: 16 BRPM | HEIGHT: 68 IN | SYSTOLIC BLOOD PRESSURE: 111 MMHG | DIASTOLIC BLOOD PRESSURE: 79 MMHG | BODY MASS INDEX: 24.73 KG/M2

## 2017-11-08 DIAGNOSIS — C73 PAPILLARY THYROID CARCINOMA (H): Primary | ICD-10-CM

## 2017-11-08 LAB
GLUCOSE BLDC GLUCOMTR-MCNC: 84 MG/DL (ref 70–99)
HCG UR QL: NEGATIVE
PTH-INTACT SERPL-MCNC: 14 PG/ML (ref 12–72)

## 2017-11-08 PROCEDURE — 25000128 H RX IP 250 OP 636: Performed by: ANESTHESIOLOGY

## 2017-11-08 PROCEDURE — 37000008 ZZH ANESTHESIA TECHNICAL FEE, 1ST 30 MIN: Performed by: SURGERY

## 2017-11-08 PROCEDURE — 00000146 ZZHCL STATISTIC GLUCOSE BY METER IP

## 2017-11-08 PROCEDURE — 88307 TISSUE EXAM BY PATHOLOGIST: CPT | Performed by: SURGERY

## 2017-11-08 PROCEDURE — 25000128 H RX IP 250 OP 636: Performed by: NURSE ANESTHETIST, CERTIFIED REGISTERED

## 2017-11-08 PROCEDURE — 81025 URINE PREGNANCY TEST: CPT | Performed by: ANESTHESIOLOGY

## 2017-11-08 PROCEDURE — 25000125 ZZHC RX 250: Performed by: NURSE ANESTHETIST, CERTIFIED REGISTERED

## 2017-11-08 PROCEDURE — 36415 COLL VENOUS BLD VENIPUNCTURE: CPT | Performed by: SURGERY

## 2017-11-08 PROCEDURE — 71000014 ZZH RECOVERY PHASE 1 LEVEL 2 FIRST HR: Performed by: SURGERY

## 2017-11-08 PROCEDURE — 36000062 ZZH SURGERY LEVEL 4 1ST 30 MIN - UMMC: Performed by: SURGERY

## 2017-11-08 PROCEDURE — 37000009 ZZH ANESTHESIA TECHNICAL FEE, EACH ADDTL 15 MIN: Performed by: SURGERY

## 2017-11-08 PROCEDURE — 71000027 ZZH RECOVERY PHASE 2 EACH 15 MINS: Performed by: SURGERY

## 2017-11-08 PROCEDURE — 83970 ASSAY OF PARATHORMONE: CPT | Performed by: SURGERY

## 2017-11-08 PROCEDURE — 25000565 ZZH ISOFLURANE, EA 15 MIN: Performed by: SURGERY

## 2017-11-08 PROCEDURE — 27210794 ZZH OR GENERAL SUPPLY STERILE: Performed by: SURGERY

## 2017-11-08 PROCEDURE — 36000064 ZZH SURGERY LEVEL 4 EA 15 ADDTL MIN - UMMC: Performed by: SURGERY

## 2017-11-08 PROCEDURE — 40000170 ZZH STATISTIC PRE-PROCEDURE ASSESSMENT II: Performed by: SURGERY

## 2017-11-08 RX ORDER — ONDANSETRON 4 MG/1
4 TABLET, ORALLY DISINTEGRATING ORAL EVERY 30 MIN PRN
Status: DISCONTINUED | OUTPATIENT
Start: 2017-11-08 | End: 2017-11-08 | Stop reason: HOSPADM

## 2017-11-08 RX ORDER — OXYCODONE HYDROCHLORIDE 5 MG/1
5-10 TABLET ORAL EVERY 4 HOURS PRN
Qty: 30 TABLET | Refills: 0 | Status: SHIPPED | OUTPATIENT
Start: 2017-11-08 | End: 2017-11-20

## 2017-11-08 RX ORDER — EPHEDRINE SULFATE 50 MG/ML
INJECTION, SOLUTION INTRAMUSCULAR; INTRAVENOUS; SUBCUTANEOUS PRN
Status: DISCONTINUED | OUTPATIENT
Start: 2017-11-08 | End: 2017-11-08

## 2017-11-08 RX ORDER — ACETAMINOPHEN 325 MG/1
650 TABLET ORAL
Status: DISCONTINUED | OUTPATIENT
Start: 2017-11-08 | End: 2017-11-08 | Stop reason: HOSPADM

## 2017-11-08 RX ORDER — SODIUM CHLORIDE, SODIUM LACTATE, POTASSIUM CHLORIDE, CALCIUM CHLORIDE 600; 310; 30; 20 MG/100ML; MG/100ML; MG/100ML; MG/100ML
INJECTION, SOLUTION INTRAVENOUS CONTINUOUS
Status: DISCONTINUED | OUTPATIENT
Start: 2017-11-08 | End: 2017-11-08 | Stop reason: HOSPADM

## 2017-11-08 RX ORDER — OXYCODONE HYDROCHLORIDE 5 MG/1
5-10 TABLET ORAL
Status: DISCONTINUED | OUTPATIENT
Start: 2017-11-08 | End: 2017-11-08 | Stop reason: HOSPADM

## 2017-11-08 RX ORDER — HYDRALAZINE HYDROCHLORIDE 20 MG/ML
2.5-5 INJECTION INTRAMUSCULAR; INTRAVENOUS EVERY 10 MIN PRN
Status: DISCONTINUED | OUTPATIENT
Start: 2017-11-08 | End: 2017-11-08 | Stop reason: HOSPADM

## 2017-11-08 RX ORDER — PROPOFOL 10 MG/ML
INJECTION, EMULSION INTRAVENOUS PRN
Status: DISCONTINUED | OUTPATIENT
Start: 2017-11-08 | End: 2017-11-08

## 2017-11-08 RX ORDER — NALOXONE HYDROCHLORIDE 0.4 MG/ML
.1-.4 INJECTION, SOLUTION INTRAMUSCULAR; INTRAVENOUS; SUBCUTANEOUS
Status: DISCONTINUED | OUTPATIENT
Start: 2017-11-08 | End: 2017-11-08 | Stop reason: HOSPADM

## 2017-11-08 RX ORDER — ACETAMINOPHEN 325 MG/1
650 TABLET ORAL EVERY 4 HOURS PRN
Qty: 100 TABLET | Refills: 0 | Status: SHIPPED | OUTPATIENT
Start: 2017-11-08

## 2017-11-08 RX ORDER — ONDANSETRON 2 MG/ML
INJECTION INTRAMUSCULAR; INTRAVENOUS PRN
Status: DISCONTINUED | OUTPATIENT
Start: 2017-11-08 | End: 2017-11-08

## 2017-11-08 RX ORDER — FENTANYL CITRATE 50 UG/ML
25-50 INJECTION, SOLUTION INTRAMUSCULAR; INTRAVENOUS
Status: DISCONTINUED | OUTPATIENT
Start: 2017-11-08 | End: 2017-11-08 | Stop reason: HOSPADM

## 2017-11-08 RX ORDER — LIDOCAINE 40 MG/G
CREAM TOPICAL
Status: DISCONTINUED | OUTPATIENT
Start: 2017-11-08 | End: 2017-11-08 | Stop reason: HOSPADM

## 2017-11-08 RX ORDER — ONDANSETRON 2 MG/ML
4 INJECTION INTRAMUSCULAR; INTRAVENOUS EVERY 30 MIN PRN
Status: DISCONTINUED | OUTPATIENT
Start: 2017-11-08 | End: 2017-11-08 | Stop reason: HOSPADM

## 2017-11-08 RX ORDER — HYDROMORPHONE HYDROCHLORIDE 1 MG/ML
.3-.5 INJECTION, SOLUTION INTRAMUSCULAR; INTRAVENOUS; SUBCUTANEOUS EVERY 5 MIN PRN
Status: DISCONTINUED | OUTPATIENT
Start: 2017-11-08 | End: 2017-11-08 | Stop reason: HOSPADM

## 2017-11-08 RX ORDER — DEXAMETHASONE SODIUM PHOSPHATE 4 MG/ML
INJECTION, SOLUTION INTRA-ARTICULAR; INTRALESIONAL; INTRAMUSCULAR; INTRAVENOUS; SOFT TISSUE PRN
Status: DISCONTINUED | OUTPATIENT
Start: 2017-11-08 | End: 2017-11-08

## 2017-11-08 RX ORDER — FENTANYL CITRATE 50 UG/ML
INJECTION, SOLUTION INTRAMUSCULAR; INTRAVENOUS PRN
Status: DISCONTINUED | OUTPATIENT
Start: 2017-11-08 | End: 2017-11-08

## 2017-11-08 RX ORDER — LABETALOL HYDROCHLORIDE 5 MG/ML
10 INJECTION, SOLUTION INTRAVENOUS
Status: DISCONTINUED | OUTPATIENT
Start: 2017-11-08 | End: 2017-11-08 | Stop reason: HOSPADM

## 2017-11-08 RX ADMIN — PHENYLEPHRINE HYDROCHLORIDE 100 MCG: 10 INJECTION, SOLUTION INTRAMUSCULAR; INTRAVENOUS; SUBCUTANEOUS at 14:27

## 2017-11-08 RX ADMIN — FENTANYL CITRATE 25 MCG: 50 INJECTION INTRAMUSCULAR; INTRAVENOUS at 16:22

## 2017-11-08 RX ADMIN — FENTANYL CITRATE 50 MCG: 50 INJECTION, SOLUTION INTRAMUSCULAR; INTRAVENOUS at 15:30

## 2017-11-08 RX ADMIN — PROPOFOL 30 MG: 10 INJECTION, EMULSION INTRAVENOUS at 15:27

## 2017-11-08 RX ADMIN — DEXAMETHASONE SODIUM PHOSPHATE 8 MG: 4 INJECTION, SOLUTION INTRA-ARTICULAR; INTRALESIONAL; INTRAMUSCULAR; INTRAVENOUS; SOFT TISSUE at 14:04

## 2017-11-08 RX ADMIN — ONDANSETRON 4 MG: 2 INJECTION INTRAMUSCULAR; INTRAVENOUS at 15:31

## 2017-11-08 RX ADMIN — Medication 5 MG: at 14:45

## 2017-11-08 RX ADMIN — MIDAZOLAM HYDROCHLORIDE 2 MG: 1 INJECTION, SOLUTION INTRAMUSCULAR; INTRAVENOUS at 13:44

## 2017-11-08 RX ADMIN — PROPOFOL 50 MG: 10 INJECTION, EMULSION INTRAVENOUS at 14:06

## 2017-11-08 RX ADMIN — SODIUM CHLORIDE, POTASSIUM CHLORIDE, SODIUM LACTATE AND CALCIUM CHLORIDE: 600; 310; 30; 20 INJECTION, SOLUTION INTRAVENOUS at 13:44

## 2017-11-08 RX ADMIN — REMIFENTANIL HYDROCHLORIDE 0.08 MCG/KG/MIN: 1 INJECTION, POWDER, LYOPHILIZED, FOR SOLUTION INTRAVENOUS at 13:58

## 2017-11-08 RX ADMIN — HYDROMORPHONE HYDROCHLORIDE 0.3 MG: 1 INJECTION, SOLUTION INTRAMUSCULAR; INTRAVENOUS; SUBCUTANEOUS at 16:35

## 2017-11-08 RX ADMIN — Medication 100 MG: at 13:52

## 2017-11-08 RX ADMIN — FENTANYL CITRATE 25 MCG: 50 INJECTION INTRAMUSCULAR; INTRAVENOUS at 16:09

## 2017-11-08 RX ADMIN — FENTANYL CITRATE 50 MCG: 50 INJECTION, SOLUTION INTRAMUSCULAR; INTRAVENOUS at 15:56

## 2017-11-08 RX ADMIN — PHENYLEPHRINE HYDROCHLORIDE 100 MCG: 10 INJECTION, SOLUTION INTRAMUSCULAR; INTRAVENOUS; SUBCUTANEOUS at 14:45

## 2017-11-08 RX ADMIN — PROPOFOL 200 MG: 10 INJECTION, EMULSION INTRAVENOUS at 13:52

## 2017-11-08 RX ADMIN — PHENYLEPHRINE HYDROCHLORIDE 100 MCG: 10 INJECTION, SOLUTION INTRAMUSCULAR; INTRAVENOUS; SUBCUTANEOUS at 14:13

## 2017-11-08 RX ADMIN — PHENYLEPHRINE HYDROCHLORIDE 100 MCG: 10 INJECTION, SOLUTION INTRAMUSCULAR; INTRAVENOUS; SUBCUTANEOUS at 15:08

## 2017-11-08 RX ADMIN — FENTANYL CITRATE 50 MCG: 50 INJECTION, SOLUTION INTRAMUSCULAR; INTRAVENOUS at 13:48

## 2017-11-08 NOTE — OR NURSING
Spoke with Dr Narvaez from surgery, patient ok to discharge home regardless of what PHI lab level is.

## 2017-11-08 NOTE — ANESTHESIA CARE TRANSFER NOTE
Patient: Joy Quispe    Procedure(s):  Re-Operative Central Neck Dissection  - Wound Class: I-Clean    Diagnosis: Malignant Neoplasm Of Thyroid Gland   Diagnosis Additional Information: No value filed.    Anesthesia Type:   General, ETT     Note:  Airway :Face Mask  Patient transferred to:PACU  Comments: Pt alert, breathing spontaneously on 8L O2 via FM. VSS. Report shared with RN.Handoff Report: Identifed the Patient, Identified the Reponsible Provider, Reviewed the pertinent medical history, Discussed the surgical course, Reviewed Intra-OP anesthesia mangement and issues during anesthesia, Set expectations for post-procedure period and Allowed opportunity for questions and acknowledgement of understanding      Vitals: (Last set prior to Anesthesia Care Transfer)    CRNA VITALS  11/8/2017 1518 - 11/8/2017 1552      11/8/2017             Resp Rate (observed): (!)  2                Electronically Signed By: FRANKIE Crowe CRNA  November 8, 2017  3:52 PM

## 2017-11-08 NOTE — ANESTHESIA POSTPROCEDURE EVALUATION
Patient: Joy Quispe    Procedure(s):  Re-Operative Central Neck Dissection  - Wound Class: I-Clean    Diagnosis:Malignant Neoplasm Of Thyroid Gland   Diagnosis Additional Information: No value filed.    Anesthesia Type:  General, ETT    Note:  Anesthesia Post Evaluation    Patient location during evaluation: PACU  Patient participation: Able to fully participate in evaluation  Level of consciousness: awake and alert  Pain management: satisfactory to patient  Airway patency: patent  Cardiovascular status: blood pressure returned to baseline and hemodynamically stable  Respiratory status: room air  Hydration status: euvolemic  PONV: controlled     Anesthetic complications: None          Last vitals:  Vitals:    11/08/17 1555 11/08/17 1600 11/08/17 1615   BP: 122/73 122/74 131/74   Resp: 16 18 18   Temp: 36.9  C (98.4  F) 36.9  C (98.4  F) 36.9  C (98.4  F)   SpO2: 99% 99% 98%         Electronically Signed By: Addy De Guzman MD  November 8, 2017  4:31 PM

## 2017-11-08 NOTE — OR NURSING
Dr. Cordova at bedside speaking with patient and sign consent. Text paged Dr. Cordova to place consent and pre op orders in epic. RN unable to release sign and held orders for 10/4/17.

## 2017-11-08 NOTE — BRIEF OP NOTE
[facility] [place]    Brief Operative Note    Pre-operative diagnosis: Papillary thyroid cancer  Post-operative diagnosis Same  Procedure:Re-Operative Central Neck Dissection  - Wound Class: I-Clean  Surgeon: Surgeon(s) and Role:     * Yanet Cordova MD - Primary     * Matilda Narvaez MD PGY-6 - Assisting  Anesthesia: General   Estimated blood loss: 5 ml  Drains: None  Specimens:   ID Type Source Tests Collected by Time Destination   A : level 7 and left level 6 Tissue Neck SURGICAL PATHOLOGY EXAM Yanet Cordova MD 11/8/2017  3:30 PM      Findings:   None.  Complications: None.  Implants: None.

## 2017-11-08 NOTE — IP AVS SNAPSHOT
Post Anesthesia Care Unit [address] [address]    Phone:  [phone]                                       After Visit Summary   11/8/2017    Joy Quispe    MRN: [id number]           After Visit Summary Signature Page     I have received my discharge instructions, and my questions have been answered. I have discussed any challenges I see with this plan with the nurse or doctor.    ..........................................................................................................................................  Patient/Patient Representative Signature      ..........................................................................................................................................  Patient Representative Print Name and Relationship to Patient    ..................................................               ................................................  Date                                            Time    ..........................................................................................................................................  Reviewed by Signature/Title    ...................................................              ..............................................  Date                                                            Time

## 2017-11-08 NOTE — DISCHARGE INSTRUCTIONS
[facility]  Same-Day Surgery   Adult Discharge Orders & Instructions     For 24 hours after surgery    1. Get plenty of rest.  A responsible adult must stay with you for at least 24 hours after you leave the hospital.   2. Do not drive or use heavy equipment.  If you have weakness or tingling, don't drive or use heavy equipment until this feeling goes away.  3. Do not drink alcohol.  4. Avoid strenuous or risky activities.  Ask for help when climbing stairs.   5. You may feel lightheaded.  IF so, sit for a few minutes before standing.  Have someone help you get up.   6. If you have nausea (feel sick to your stomach): Drink only clear liquids such as apple juice, ginger ale, broth or 7-Up.  Rest may also help.  Be sure to drink enough fluids.  Move to a regular diet as you feel able.  7. You may have a slight fever. Call the doctor if your fever is over 100 F (37.7 C) (taken under the tongue) or lasts longer than 24 hours.  8. You may have a dry mouth, a sore throat, muscle aches or trouble sleeping.  These should go away after 24 hours.  9. Do not make important or legal decisions.   Call your doctor for any of the followin.  Signs of infection (fever, growing tenderness at the surgery site, a large amount of drainage or bleeding, severe pain, foul-smelling drainage, redness, swelling).    2. It has been over 8 to 10 hours since surgery and you are still not able to urinate (pass water).    3.  Headache for over 24 hours.      To contact a doctor, call Dr Cordova's office at [phone] or:        [phone] and ask for the resident on call for   ENT (answered 24 hours a day)      Emergency Department:    [facility]: [phone]       (TTY for hearing impaired: [phone])    [facility]: [phone]       (TTY for hearing impaired: [phone])

## 2017-11-08 NOTE — OR NURSING
Pt advised of slight delay in her procedure, and expected time for her procedure now, pt and her  verbalized understanding

## 2017-11-08 NOTE — IP AVS SNAPSHOT
MRN:[id number]                      After Visit Summary   11/8/2017    Joy Quispe    MRN: [id number]           Thank you!     Thank you for choosing [facility] for your care. Our goal is always to provide you with excellent care. Hearing back from our patients is one way we can continue to improve our services. Please take a few minutes to complete the written survey that you may receive in the mail after you visit with us. Thank you!        Patient Information     Date Of Birth          1975        About your hospital stay     You were admitted on:  November 8, 2017 You last received care in the:  Post Anesthesia Care Unit [facility]    You were discharged on:  November 8, 2017       Who to Call     For medical emergencies, please call [phone].  For non-urgent questions about your medical care, please call your primary care provider or clinic, [phone]  For questions related to your surgery, please call your surgery clinic        Attending Provider     Provider Specialty    Yanet Cordova MD General Surgery       Primary Care Provider Office Phone # Fax #    Valeri Field [phone]      After Care Instructions     Discharge Instructions       Follow up appointment as instructed by Surgeon and or RN.    May start regular diet immediately.    No activity restrictions.    May shower now. No bathing for two weeks.    Call [phone] to re-schedule appointments or with routine questions during the work week.      Call [phone] and ask to speak with surgery resident if you are having troubles in the evenings, at night, or on weekends. Please call if you experience increasing abdominal pain, nausea, vomiting, increasing drainage from your wounds, chills, or fever >101.5    If you have difficulty with breathing or rapid neck swelling, go to the Emergency Department.     Take stool softener while taking narcotic pain medication to prevent constipation.    No  driving for at least 12 hours after taking narcotic pain medication.    Please call Dr. Cordova with questions or concerns at [phone].                  Your next 10 appointments already scheduled     2017  3:30 PM CST   (Arrive by 3:15 PM)   Return Visit with Yanet Cordova MD   [facility] Ear Nose and Throat ([facility] [place])    [address] [address] [phone]              Further instructions from your care team       [facility]  Same-Day Surgery   Adult Discharge Orders & Instructions     For 24 hours after surgery    1. Get plenty of rest.  A responsible adult must stay with you for at least 24 hours after you leave the hospital.   2. Do not drive or use heavy equipment.  If you have weakness or tingling, don't drive or use heavy equipment until this feeling goes away.  3. Do not drink alcohol.  4. Avoid strenuous or risky activities.  Ask for help when climbing stairs.   5. You may feel lightheaded.  IF so, sit for a few minutes before standing.  Have someone help you get up.   6. If you have nausea (feel sick to your stomach): Drink only clear liquids such as apple juice, ginger ale, broth or 7-Up.  Rest may also help.  Be sure to drink enough fluids.  Move to a regular diet as you feel able.  7. You may have a slight fever. Call the doctor if your fever is over 100 F (37.7 C) (taken under the tongue) or lasts longer than 24 hours.  8. You may have a dry mouth, a sore throat, muscle aches or trouble sleeping.  These should go away after 24 hours.  9. Do not make important or legal decisions.   Call your doctor for any of the followin.  Signs of infection (fever, growing tenderness at the surgery site, a large amount of drainage or bleeding, severe pain, foul-smelling drainage, redness, swelling).    2. It has been over 8 to 10 hours since surgery and you are still not able to urinate  "(pass water).    3.  Headache for over 24 hours.      To contact a doctor, call Dr Cordova's office at [phone] or:        [phone] and ask for the resident on call for   ENT (answered 24 hours a day)      Emergency Department:    [facility]: [phone]       (TTY for hearing impaired: [phone])    [facility]: [phone]       (TTY for hearing impaired: [phone])          Pending Results     Date and Time Order Name Status Description    11/8/2017 1601 Parathyroid Hormone Intact In process     11/8/2017 1530 Surgical pathology exam In process             Admission Information     Date & Time Provider Department Dept. Phone    11/8/2017 Yanet Cordova MD Post Anesthesia Care Unit [facility] [phone]      Your Vitals Were     Blood Pressure Temperature Respirations Height Weight       123/73 99.5  F (37.5  C) 16 1.73 m (5' 8.11\") 74 kg (163 lb 2.3 oz)     Pulse Oximetry BMI (Body Mass Index)                94% 24.73 kg/m2          MyChart Information     [email or web address] gives you secure access to your electronic health record. If you see a primary care provider, you can also send messages to your care team and make appointments. If you have questions, please call your primary care clinic.  If you do not have a primary care provider, please call [phone] and they will assist you.        Care EveryWhere ID     This is your Care EveryWhere ID. This could be used by other organizations to access your [facility] medical records  [id number]        Equal Access to Services     MAYA DEAN : Hadii chandan truongo Sohakeemali, waaxda luqadaha, qaybta kaalmada adeegyada, waxAnderson Regional Medical Centeramna new. So [facility] [phone].    ATENCIÓN: Si habla español, tiene a royal disposición servicios gratuitos de asistencia lingüística. Llame al [phone].    We comply with applicable federal civil rights laws and [place] laws. We do not discriminate on the basis of race, color, " national origin, age, disability, sex, sexual orientation, or gender identity.               Review of your medicines      START taking        Dose / Directions    acetaminophen 325 MG tablet   Commonly known as:  TYLENOL   Used for:  Papillary thyroid carcinoma (H)        Dose:  650 mg   Take 2 tablets (650 mg) by mouth every 4 hours as needed for other (mild pain)   Quantity:  100 tablet   Refills:  0       oxyCODONE IR 5 MG tablet   Commonly known as:  ROXICODONE   Used for:  Papillary thyroid carcinoma (H)        Dose:  5-10 mg   Take 1-2 tablets (5-10 mg) by mouth every 4 hours as needed for pain or other (Moderate to Severe)   Quantity:  30 tablet   Refills:  0         CONTINUE these medicines which have NOT CHANGED        Dose / Directions    cholecalciferol 5000 UNITS Caps capsule   Commonly known as:  vitamin D3   Used for:  Papillary thyroid carcinoma (H), Postsurgical hypothyroidism, Bone lesion        Take by mouth daily   Refills:  0       levonorgestrel 20 MCG/24HR IUD   Commonly known as:  MIRENA   Used for:  Papillary thyroid carcinoma (H), Postsurgical hypothyroidism        Refills:  0       SYNTHROID 150 MCG tablet   Used for:  Papillary thyroid carcinoma (H), Postsurgical hypothyroidism, Bone lesion   Generic drug:  levothyroxine        Dose:  150 mcg   Take 1 tablet (150 mcg) by mouth daily   Quantity:  90 tablet   Refills:  3            Where to get your medicines      These medications were sent to [facility]/pharmacy #[id number] [place] [id number] [address] [address]     Phone:  [phone]     acetaminophen 325 MG tablet         Some of these will need a paper prescription and others can be bought over the counter. Ask your nurse if you have questions.     Bring a paper prescription for each of these medications     oxyCODONE IR 5 MG tablet                Protect others around you: Learn how to safely use, store and throw away your medicines at  www.disposemymeds.org.             Medication List: This is a list of all your medications and when to take them. Check marks below indicate your daily home schedule. Keep this list as a reference.      Medications           Morning Afternoon Evening Bedtime As Needed    acetaminophen 325 MG tablet   Commonly known as:  TYLENOL   Take 2 tablets (650 mg) by mouth every 4 hours as needed for other (mild pain)                                cholecalciferol 5000 UNITS Caps capsule   Commonly known as:  vitamin D3   Take by mouth daily                                levonorgestrel 20 MCG/24HR IUD   Commonly known as:  MIRENA                                oxyCODONE IR 5 MG tablet   Commonly known as:  ROXICODONE   Take 1-2 tablets (5-10 mg) by mouth every 4 hours as needed for pain or other (Moderate to Severe)                                SYNTHROID 150 MCG tablet   Take 1 tablet (150 mcg) by mouth daily   Generic drug:  levothyroxine

## 2017-11-08 NOTE — ANESTHESIA PREPROCEDURE EVALUATION
Anesthesia Evaluation     .             ROS/MED HX    ENT/Pulmonary: Comment: Thyroid cancer s/p thyroidectomy  Here for central neck dissection      Neurologic:  - neg neurologic ROS     Cardiovascular:  - neg cardiovascular ROS       METS/Exercise Tolerance:  >4 METS   Hematologic:         Musculoskeletal:  - neg musculoskeletal ROS       GI/Hepatic:  - neg GI/hepatic ROS       Renal/Genitourinary:  - ROS Renal section negative       Endo:     (+) thyroid problem .      Psychiatric:         Infectious Disease:  - neg infectious disease ROS       Malignancy:         Other:                     Physical Exam  Normal systems: cardiovascular, pulmonary and dental    Airway   Mallampati: I  TM distance: >3 FB  Neck ROM: full    Dental     Cardiovascular   Rhythm and rate: regular and normal      Pulmonary    breath sounds clear to auscultation                    Anesthesia Plan      History & Physical Review  History and physical reviewed and following examination; no interval change.    ASA Status:  2 .    NPO Status:  > 8 hours    Plan for General and ETT with Intravenous induction. Maintenance will be Balanced.    PONV prophylaxis:  Ondansetron (or other 5HT-3) and Dexamethasone or Solumedrol       Postoperative Care  Postoperative pain management:  IV analgesics.      Consents  Anesthetic plan, risks, benefits and alternatives discussed with:  Patient.  Use of blood products discussed: Yes.   Use of blood products discussed with Patient.  Consented to blood products.  .                          .

## 2017-11-13 LAB — COPATH REPORT: NORMAL

## 2017-11-20 ENCOUNTER — OFFICE VISIT (OUTPATIENT)
Dept: OTOLARYNGOLOGY | Facility: CLINIC | Age: 42
End: 2017-11-20

## 2017-11-20 DIAGNOSIS — C73 MALIGNANT NEOPLASM OF THYROID GLAND (H): Primary | ICD-10-CM

## 2017-11-20 ASSESSMENT — PAIN SCALES - GENERAL: PAINLEVEL: NO PAIN (0)

## 2017-11-20 NOTE — LETTER
11/20/2017       RE: Joy Quispe  [address] [place] [address]     Dear Colleague,    Thank you for referring your patient, Joy Quispe, to the [facility] EAR NOSE AND THROAT at [facility]. Please see a copy of my visit note below.    REASON FOR VISIT:  This is a 42-year-old female who underwent a central neck dissection on 11/08/2017.  She was discharged to home the same day.  Since the surgery, the patient has had no problems with voice quality, inspiration or swallowing.  She has no symptoms of hypocalcemia.  In fact, her parathyroid hormone level was checked and the postop area noted to be normal.      PHYSICAL EXAMINATION:  Her incision is healing well.  The Dermabond has been removed. The sutures were also clipped at the skin edges.  There is no evidence of hematoma, seroma or infection.      Pathology was reviewed with the patient and a copy of the pathology report was given.      ASSESSMENT:  Followup status post reoperative central neck dissection.      PLAN:     1.  I reviewed with the patient wound management and wound massage.   2.  She will follow up with Dr. Bah at about 3-4 weeks.  She will follow up with me on an as-needed basis.         Again, thank you for allowing me to participate in the care of your patient.      Sincerely,    Yanet Cordova MD

## 2017-11-20 NOTE — MR AVS SNAPSHOT
After Visit Summary   11/20/2017    Joy Quispe    MRN: [id number]           Patient Information     Date Of Birth          1975        Visit Information        Provider Department      11/20/2017 3:30 PM Yanet Cordova MD [facility] Ear Nose and Throat        Today's Diagnoses     Malignant neoplasm of thyroid gland (H)    -  1      Care Instructions    Follow up is on an as needed basis. Please call our triage RN at [phone], for questions or concerns.            Follow-ups after your visit        Who to contact     Please call your clinic at [phone] to:    Ask questions about your health    Make or cancel appointments    Discuss your medicines    Learn about your test results    Speak to your doctor   If you have compliments or concerns about an experience at your clinic, or if you wish to file a complaint, please contact [facility] Patient Relations at [phone] or email us at [email or web address]         Additional Information About Your Visit        [facility] Information     [employer] gives you secure access to your electronic health record. If you see a primary care provider, you can also send messages to your care team and make appointments. If you have questions, please call your primary care clinic.  If you do not have a primary care provider, please call [phone] and they will assist you.      [employer]) is an electronic gateway that provides easy, online access to your medical records. With [employer]), you can request a clinic appointment, read your test results, renew a prescription or communicate with your care team.     To access your existing account, please contact your [facility] or call [phone] for assistance.        Care EveryWhere ID     This is your Care EveryWhere ID. This could be used by other organizations to access your [place] medical records  [id number]        Your Vitals Were         Blood Pressure from Last 3 Encounters:   11/08/17 111/79   08/23/17 107/70   04/21/17 130/70    Weight from Last 3 Encounters:   11/08/17 74 kg (163 lb 2.3 oz)   10/02/17 75.8 kg (167 lb)   08/23/17 74.8 kg (165 lb)              Today, you had the following     No orders found for display       Primary Care Provider Office Phone # Fax #    Valeri Field [phone]       [facility] [address]  [facility] [address]        Equal Access to Services     [facility]: Hadii chandan arngel Soheidy, waesmer diaz, paul kaalmamillie oseguera, munir crawford . So [facility] [phone].    ATENCIÓN: Si habla español, tiene a royal disposición servicios gratuitos de asistencia lingüística. Ll[facility] [phone].    We comply with applicable federal civil rights laws and [place] laws. We do not discriminate on the basis of race, color, national origin, age, disability, sex, sexual orientation, or gender identity.            Thank you!     Thank you for choosing [facility] EAR NOSE AND THROAT  for your care. Our goal is always to provide you with excellent care. Hearing back from our patients is one way we can continue to improve our services. Please take a few minutes to complete the written survey that you may receive in the mail after your visit with us. Thank you!             Your Updated Medication List - Protect others around you: Learn how to safely use, store and throw away your medicines at www.disposemymeds.org.          This list is accurate as of: 11/20/17 11:59 PM.  Always use your most recent med list.                   Brand Name Dispense Instructions for use Diagnosis    acetaminophen 325 MG tablet    TYLENOL    100 tablet    Take 2 tablets (650 mg) by mouth every 4 hours as needed for other (mild pain)    Papillary thyroid carcinoma (H)       cholecalciferol 5000 UNITS Caps capsule    vitamin D3     Take by mouth daily    Papillary thyroid carcinoma (H),  Postsurgical hypothyroidism, Bone lesion       levonorgestrel 20 MCG/24HR IUD    MIRENA      Papillary thyroid carcinoma (H), Postsurgical hypothyroidism       SYNTHROID 150 MCG tablet   Generic drug:  levothyroxine     90 tablet    Take 1 tablet (150 mcg) by mouth daily    Papillary thyroid carcinoma (H), Postsurgical hypothyroidism, Bone lesion

## 2017-11-20 NOTE — PATIENT INSTRUCTIONS
Follow up is on an as needed basis. Please call our triage RN at [phone], for questions or concerns.

## 2017-11-20 NOTE — PROGRESS NOTES
REASON FOR VISIT:  This is a 42-year-old female who underwent a central neck dissection on 11/08/2017.  She was discharged to home the same day.  Since the surgery, the patient has had no problems with voice quality, inspiration or swallowing.  She has no symptoms of hypocalcemia.  In fact, her parathyroid hormone level was checked and the postop area noted to be normal.      PHYSICAL EXAMINATION:  Her incision is healing well.  The Dermabond has been removed. The sutures were also clipped at the skin edges.  There is no evidence of hematoma, seroma or infection.      Pathology was reviewed with the patient and a copy of the pathology report was given.      ASSESSMENT:  Followup status post reoperative central neck dissection.      PLAN:     1.  I reviewed with the patient wound management and wound massage.   2.  She will follow up with Dr. Bah at about 3-4 weeks.  She will follow up with me on an as-needed basis.

## 2017-11-22 NOTE — OP NOTE
DATE OF SURGERY:  11/08/2017.        PREOPERATIVE DIAGNOSIS:  Metastatic papillary thyroid carcinoma.      POSTOPERATIVE DIAGNOSIS:  Metastatic papillary thyroid carcinoma.      SURGICAL PROCEDURE PERFORMED:  Reoperative central neck dissection including levels 6 and 7 with 60 minutes with intraoperative recurrent laryngeal nerve monitoring.      SURGEON:  Yanet Cordova MD      ASSISTANT:  Matilda Narvaez MD      ANESTHESIA:  General endotracheal with nerve monitoring endotracheal tube.      COMPLICATIONS:  None.      ESTIMATED BLOOD LOSS:  Less than 5 mL.      CLINICAL INDICATIONS FOR THE PROCEDURE:  Joy Quispe is a 42-year-old female who underwent a total thyroidectomy earlier in 2017.  There was noted to be papillary thyroid carcinoma within the specimen.  The patient did received radioactive iodine.  Upon her evaluation with Dr. Bah, thyroid globulin and ultrasound was obtained.  The patient had concerning lymphadenopathy in the left central neck extending down into the mediastinum.  Biopsy was consistent with papillary thyroid carcinoma.  Based on this, it was recommended the patient undergo a reoperative central neck dissection.      The details of the procedure were discussed with the patient as well as the risks including but not limited to bleeding, infection, injury to the recurrent laryngeal nerve or nerves, potential permanent hypocalcemia.  Consent was obtained.      DETAILS OF PROCEDURE:  The patient was brought to the operating room in stable condition, placed on the operating table in supine position.  After appropriate general anesthesia was obtained, the patient was prepped and draped in sterile fashion.  A timeout was then performed.  The previous incisional scar was excised.  We then dissected down through the platysma.  Subplatysmal planes were then created.  The strap muscles were divided at the midline and then retracted laterally.  There was some scar tissue present.  Almost  immediately, over the trachea inferiorly we felt very firm nodules.  Therefore, we followed this nodular complex superiorly extended laterally to identify the left recurrent laryngeal nerve, followed the recurrent laryngeal nerve inferiorly, we were actually able to find a left inferior parathyroid gland and save this and then dissected down to visualization and dissecting off of the innominate artery and then carried our dissection just to the right of the isthmus and then superiorly.  This was level 6 and 7.  All of this was resected in toto.  The left recurrent laryngeal nerve remained in view throughout.  We used a combination of bipolar and monopolar cautery throughout.  Once the specimen was completely removed, the area was copiously irrigated.  It was reinspected and palpated to assure there is no other obvious lymphadenopathy, of which there was none.  We then confirmed that the left recurrent laryngeal nerve was intact visibly and confirmed to be intact with nerve stimulation.  I should note that 2 medium clips were placed at our most inferior border for marker to zohreh, again, our most inferior border.  Fibrillar was then placed in the operative bed.  The strap muscles were then approximated at the midline using a 3-0 chromic interrupted suture.  The platysma was approximated using 3-0 chromic interrupted suture.  The skin incision was approximated using a 5-0 Monocryl running subcuticular suture.  The wound was dressed with Dermabond.  The patient was then extubated and returned to the recovery room in stable condition.  I was present during the entire surgical procedure.         JESS MARQUEZ MD             D: 2017 17:10   T: 2017 23:52   MT:       Name:     RHIANNON SILVA   MRN:      [id number]        Account:        [id number]   :      1975           Procedure Date: 2017      Document: [id number]

## 2018-01-02 DIAGNOSIS — C73 PAPILLARY THYROID CARCINOMA (H): Primary | ICD-10-CM

## 2018-01-02 DIAGNOSIS — E89.0 POSTSURGICAL HYPOTHYROIDISM: ICD-10-CM

## 2018-01-12 ENCOUNTER — VIRTUAL VISIT (OUTPATIENT)
Dept: ENDOCRINOLOGY | Facility: CLINIC | Age: 43
End: 2018-01-12
Payer: COMMERCIAL

## 2018-01-12 DIAGNOSIS — C73 PAPILLARY THYROID CARCINOMA (H): Primary | ICD-10-CM

## 2018-01-12 DIAGNOSIS — E89.0 POSTSURGICAL HYPOTHYROIDISM: ICD-10-CM

## 2018-01-12 NOTE — PROGRESS NOTES
Endocrine Consult note    Attending Assessment/Plan :     1.  Papillary thyroid carcinoma, unifocal 2.5 cm, isthmus region, LN + including bulky notes up to 1.8 cm with extranodal extension  pT2, pN1, pMx, possible cM1 but this is very unclear .  MACIS3.95 best case, and 7.95 worst case if she has distant mets and incomplete resection  She has had 2 neck operations, 131I Rx x 1.  We have questions from the lst 131I TBS which will cause us to repeat the study this year.  Discussed.  Neck US before March appt    2.  Post surgical hypothyroidism. Labs today. Treat to TSH < 0.4.    Unstable.  She is now on Synthroid 150 mcg/day.   TFTS and calcium related labs     3.  Abnormal  post therapy TBS performed at [facility]  - as per # 4.    4.  Abnormal bone CT, abnormal bone scan .  A Right sacral ala on bone scan  - sclerotic on CT coronal per my review of images  B) left acetabular on bone scan, sclerotic- - this area is likely too low to correspond to what was seen on 131I .    Late SPECT overlay 131I TBS at [facility] did not show abnormal uptake but the signal was lower by then.  This does not fully exclude the possibility of bone mets.      START TIME 0931 AM  STOP TIME 0938 AM  TOTAL TIME: 7 minutes    Katy Bah MD      Cc/ HPI :  Joy presents for follow up of papillary thyroid carcinoma, post surgical hypothyroidism, abnormal bone imaging.     Joy has now had 2 operations for the thyroid cancer:   total thyroidectomy 2/6/17 (Dr Espinoza Taylor, [facility]) removing unifocal 2.5 cm Papillary thyroid carcinoma, isthmus with extension into right and left lobes.  3/6 lymph nodes were + for metastatic thyroid cancer.  Background lymphocytic thyroiditis was seen.   11/8/17: reoperative CND level 6 and 7 ([facility])- 5 nodes + for PCT - largest 1.8 cm, extranodal extension present, focal    She got  2 dose Thyrogen TBS followed by 131I 101 mCi on 4/13/17 ([facility] with abnormal post therapy TBS  raising questions about  the left lower quadrant, including left pelvis or GI.    I last saw her in clinic 8/23/17. At that time she was on Synthroid 125 mcg/day and we increased to 150 mcg/day. Labs on 10/2/17 showed TSH 0.9, free T4 1.45.  She was to have follow up labs after the thyroid surgery but never did.  On 11/18/17 she had a PTH of 14.     The 2nd operation went well. It was same day surgery.  She had no voice symptoms.     We have the following imaging reports:   4/13/17: 123I 6.6 mCi total body scan (Thyrogen stimulated; [facility]) moderate uptake thyroid bed.   4/13/17: 101 mCi 131I  4/21/17 post treatment TBS -raising questions about the left lower quadrant, including left pelvis or GI.      4/19/04: TSH 3.48  1/11/05: TSH 1.27, free T4 1.3  9/27/05: , free T4 1.1  3/8/08: TSH 2.86, free T4 1.1  11/9/10: TSH 2.71  9/1916: TSH 2.31  10/21/16: TPO 1060  2/16/17 Surgery  4/13/17: Tg 0.1, SIMON 210.3  4/13/17: 101 mci 131I  4/21/17: Tg 4.2, SIMON 115, TSH 5.97  8/23/17 : Tg 3.7, SIMON 115 -  11/17 Operation # 2  1/15/18: TSH 0.36  1/30/18 Tg 3.1, SIMON 17, TSH not measured with it. This followed appt and was not discussed at appt     images on PACS   4/21/17: post therapy scan ; neck uptake.  I wouldn't have called - tiny focus left abdomen lateral  And also 2 foci midline pelvis- one I would attribute to bladder and the other ?   4/28/17 CT pelvis -sclerotic bone lesions as described by radiologist.do not correspond to likely location of 131I uptake seen on outside TBS . Radiologist states some definitely represent bone islands but some are indeterminant .  5/5/17 bone scan: 2 areas noted by radiologist s- anterior column left acetabulum and right sacral body.      REVIEW OF SYSTEMS  Voice is normal  Some days tired  Cardiac: heart racing sometimes; she avoids caffeine - this is x about 1 year  Respiratory: negative  GI: negative; she remains off gluten. She is going off dairy some too.  Brain is doing OK  No longer has the  achiness    Past Medical History  Past Medical History:   Diagnosis Date     Papillary thyroid carcinoma (H) 02/06/2017     PONV (postoperative nausea and vomiting)      Post-surgical hypothyroidism 02/06/2017     Past Surgical History:   Procedure Laterality Date     EXPLORE NECK N/A 11/8/2017    Procedure: EXPLORE NECK;  Re-Operative Central Neck Dissection ;  Surgeon: Yanet Cordova MD;  Location: [facility] OR     total thyroidectomy  02/06/2017    [facility], Dr Espinoza Taylor       Medications    Current Outpatient Prescriptions   Medication Sig Dispense Refill     acetaminophen (TYLENOL) 325 MG tablet Take 2 tablets (650 mg) by mouth every 4 hours as needed for other (mild pain) 100 tablet 0     cholecalciferol (VITAMIN D3) 5000 UNITS CAPS capsule Take by mouth daily       SYNTHROID 150 MCG tablet Take 1 tablet (150 mcg) by mouth daily 90 tablet 3     levonorgestrel (MIRENA) 20 MCG/24HR IUD        She takes the vitamin D only sometimes.    Social History  Social History   Substance Use Topics     Smoking status: Never Smoker     Smokeless tobacco: Never Used     Alcohol use No     EXAM  Telephone visit - speech is clear, appropriate, sounds baseline

## 2018-01-12 NOTE — PATIENT INSTRUCTIONS
Get the blood tests drawn ASAP  Get neck ultrasound just before you see me in March  Keep scheduled appt for March, 2018

## 2018-01-12 NOTE — MR AVS SNAPSHOT
After Visit Summary   1/12/2018    Joy Quispe    MRN: [id number]           Patient Information     Date Of Birth          1975        Visit Information        Provider Department      1/12/2018 9:30 AM Katy Bah MD [facility]        Today's Diagnoses     Papillary thyroid carcinoma (H)    -  1    Postsurgical hypothyroidism          Care Instructions    Get the blood tests drawn ASAP  Get neck ultrasound just before you see me in March  Keep scheduled appt for March, 2018          Follow-ups after your visit        Your next 10 appointments already scheduled     Mar 06, 2018  3:00 PM CST   (Arrive by 2:45 PM)   RETURN ENDOCRINE with Katy Bah MD   [facility] ([facility])    [address] [place] [address] [phone]              Future tests that were ordered for you today     Open Future Orders        Priority Expected Expires Ordered    Calcium Routine  1/12/2019 1/12/2018    Phosphorus Routine  1/12/2019 1/12/2018    US head neck soft tissue Routine 3/6/2018 8/10/2018 1/12/2018            Who to contact     Please call your clinic at [phone] to:    Ask questions about your health    Make or cancel appointments    Discuss your medicines    Learn about your test results    Speak to your doctor   If you have compliments or concerns about an experience at your clinic, or if you wish to file a complaint, please contact [facility] Physicians Patient Relations at [phone] or email us at [email or web address]         Additional Information About Your Visit        MyChart Information     [employer] gives you secure access to your electronic health record. If you see a primary care provider, you can also send messages to your care team and make appointments. If you have questions, please call your primary care clinic.  If you do not have a primary care provider, please call  [phone] and they will assist you.      [employer] is an electronic gateway that provides easy, online access to your medical records. With [employer], you can request a clinic appointment, read your test results, renew a prescription or communicate with your care team.     To access your existing account, please contact your [facility] or call [phone] for assistance.        Care EveryWhere ID     This is your Care EveryWhere ID. This could be used by other organizations to access your [place] medical records  [id number]         Blood Pressure from Last 3 Encounters:   11/08/17 111/79   08/23/17 107/70   04/21/17 130/70    Weight from Last 3 Encounters:   11/08/17 74 kg (163 lb 2.3 oz)   10/02/17 75.8 kg (167 lb)   08/23/17 74.8 kg (165 lb)               Primary Care Provider Office Phone # Fax #    Valeri Field [phone]       [facility] [address] [facility] [address]        Equal Access to Services     [facility]: Hadii aad ku hadasho Soomaali, waaxda luqadaha, qaybta kaalmada adeegyada, munir crawford . So [facility] [phone].    ATENCIÓN: Si habla español, tiene a royal disposición servicios gratuitos de asistencia lingüística. Llame al [phone].    We comply with applicable federal civil rights laws and [place] laws. We do not discriminate on the basis of race, color, national origin, age, disability, sex, sexual orientation, or gender identity.            Thank you!     Thank you for choosing [facility] ENDOCRINOLOGY  for your care. Our goal is always to provide you with excellent care. Hearing back from our patients is one way we can continue to improve our services. Please take a few minutes to complete the written survey that you may receive in the mail after your visit with us. Thank you!             Your Updated Medication List - Protect others around you: Learn how to safely use, store and throw away  your medicines at www.disposemymeds.org.          This list is accurate as of: 1/12/18  9:43 AM.  Always use your most recent med list.                   Brand Name Dispense Instructions for use Diagnosis    acetaminophen 325 MG tablet    TYLENOL    100 tablet    Take 2 tablets (650 mg) by mouth every 4 hours as needed for other (mild pain)    Papillary thyroid carcinoma (H)       cholecalciferol 5000 UNITS Caps capsule    vitamin D3     Take by mouth daily    Papillary thyroid carcinoma (H), Postsurgical hypothyroidism, Bone lesion       levonorgestrel 20 MCG/24HR IUD    MIRENA      Papillary thyroid carcinoma (H), Postsurgical hypothyroidism       SYNTHROID 150 MCG tablet   Generic drug:  levothyroxine     90 tablet    Take 1 tablet (150 mcg) by mouth daily    Papillary thyroid carcinoma (H), Postsurgical hypothyroidism, Bone lesion

## 2018-01-15 DIAGNOSIS — E89.0 POSTSURGICAL HYPOTHYROIDISM: ICD-10-CM

## 2018-01-15 DIAGNOSIS — C73 PAPILLARY THYROID CARCINOMA (H): ICD-10-CM

## 2018-01-15 LAB
CALCIUM SERPL-MCNC: 8.6 MG/DL (ref 8.5–10.1)
PHOSPHATE SERPL-MCNC: 2.6 MG/DL (ref 2.5–4.5)

## 2018-01-15 PROCEDURE — 84439 ASSAY OF FREE THYROXINE: CPT

## 2018-01-15 PROCEDURE — 82310 ASSAY OF CALCIUM: CPT

## 2018-01-15 PROCEDURE — 84100 ASSAY OF PHOSPHORUS: CPT

## 2018-01-15 PROCEDURE — 84443 ASSAY THYROID STIM HORMONE: CPT

## 2018-01-15 PROCEDURE — 36415 COLL VENOUS BLD VENIPUNCTURE: CPT

## 2018-01-16 ENCOUNTER — TELEPHONE (OUTPATIENT)
Dept: ENDOCRINOLOGY | Facility: CLINIC | Age: 43
End: 2018-01-16

## 2018-01-16 LAB
T4 FREE SERPL-MCNC: 1.69 NG/DL (ref 0.76–1.46)
TSH SERPL DL<=0.005 MIU/L-ACNC: 0.36 MU/L (ref 0.4–4)

## 2018-01-16 NOTE — TELEPHONE ENCOUNTER
----- Message from Isabela Mendez RN sent at 1/16/2018  8:44 AM CST -----  Regarding: RE: call lab  She had it drawn at [place], I talked to them and they basically said they have 2 different systems, some of the labs were ordered different dates and it didn't show both system, they were able to add tsh and ft4. I messaged pt to go back for the TG.  ----- Message -----     From: Katy Bah MD     Sent: 1/15/2018   7:49 PM       To: [facility] Triage-  Subject: call lab                                         The lab missed 3 of the 5 labs htat are on the chart for her to have been drawn. See if they can add on the TSH, free T4 and thyroglobulin (orders are all there, overdue) .  If not, she will need a redraw.  Katy Bah

## 2018-01-30 DIAGNOSIS — C73 PAPILLARY THYROID CARCINOMA (H): ICD-10-CM

## 2018-01-30 DIAGNOSIS — E89.0 POSTSURGICAL HYPOTHYROIDISM: ICD-10-CM

## 2018-01-30 PROCEDURE — 86800 THYROGLOBULIN ANTIBODY: CPT | Mod: 90

## 2018-01-30 PROCEDURE — 99000 SPECIMEN HANDLING OFFICE-LAB: CPT

## 2018-01-30 PROCEDURE — 36415 COLL VENOUS BLD VENIPUNCTURE: CPT

## 2018-01-30 PROCEDURE — 84432 ASSAY OF THYROGLOBULIN: CPT | Mod: 90

## 2018-02-09 LAB — LAB SCANNED RESULT: ABNORMAL

## 2018-03-06 ENCOUNTER — OFFICE VISIT (OUTPATIENT)
Dept: ENDOCRINOLOGY | Facility: CLINIC | Age: 43
End: 2018-03-06
Payer: COMMERCIAL

## 2018-03-06 ENCOUNTER — RADIANT APPOINTMENT (OUTPATIENT)
Dept: ULTRASOUND IMAGING | Facility: CLINIC | Age: 43
End: 2018-03-06
Payer: COMMERCIAL

## 2018-03-06 VITALS
BODY MASS INDEX: 26.46 KG/M2 | HEART RATE: 88 BPM | WEIGHT: 168.6 LBS | SYSTOLIC BLOOD PRESSURE: 120 MMHG | DIASTOLIC BLOOD PRESSURE: 70 MMHG | HEIGHT: 67 IN

## 2018-03-06 DIAGNOSIS — R76.8 THYROGLOBULIN ANTIBODY POSITIVE: ICD-10-CM

## 2018-03-06 DIAGNOSIS — C73 PAPILLARY THYROID CARCINOMA (H): Primary | ICD-10-CM

## 2018-03-06 DIAGNOSIS — R94.8 ABNORMAL RADIONUCLIDE BONE SCAN: ICD-10-CM

## 2018-03-06 DIAGNOSIS — E89.0 POSTSURGICAL HYPOTHYROIDISM: ICD-10-CM

## 2018-03-06 DIAGNOSIS — C73 PAPILLARY THYROID CARCINOMA (H): ICD-10-CM

## 2018-03-06 ASSESSMENT — PAIN SCALES - GENERAL: PAINLEVEL: NO PAIN (0)

## 2018-03-06 NOTE — PROGRESS NOTES
Endocrine Consult note    Attending Assessment/Plan :     1.  Papillary thyroid carcinoma, unifocal 2.5 cm, isthmus region, LN + including bulky notes up to 1.8 cm with extranodal extension  pT2, pN1, pMx, possible cM1 but this is very unclear .  MACIS3.95 best case, and 7.95 worst case if she has distant mets and incomplete resection  She has had 2 neck operations, 131I Rx x 1.    SIMON has been dropping rapidly, which is good.  Neck US    RTC 4 months    2.  Post surgical hypothyroidism. Labs today. Treat to TSH < 0.4.    Unstable.  She is now on Synthroid 150 mcg/day.     3.  Abnormal  post therapy TBS performed at [facility]  - as per # 4.  We have questions from the [facility] 131I TBS which will cause us to repeat the study this year. Discussed protocol - I have asked her to let me know when we should try to do this.   I have given her low iodine list for future reference.    4.  Abnormal bone CT, abnormal bone scan .  A Right sacral ala on bone scan  - sclerotic on CT coronal per my review of images  B) left acetabular on bone scan, sclerotic- - this area is likely too low to correspond to what was seen on 131I .    Late SPECT overlay 131I TBS at [facility] did not show abnormal uptake but the signal was lower by then.  This does not fully exclude the possibility of bone mets.    5.  Thyroglobulin antibody present      Katy Bah MD      Cc/ HPI :  Joy presents, along with her , for follow up of papillary thyroid carcinoma, post surgical hypothyroidism, abnormal bone imaging.     Her treatment course has been as follows:  2 operations for the thyroid cancer:   2/6/17 total thyroidectomy  (Dr Espinoza Taylor, [facility]) removing unifocal 2.5 cm Papillary thyroid carcinoma, isthmus with extension into right and left lobes.  3/6 lymph nodes were + for metastatic thyroid cancer.  Background lymphocytic thyroiditis.   11/8/17: reoperative CND level 6 and 7 ([facility])- 5 nodes + for PCT - largest 1.8 cm,  extranodal extension present, focal    4/13/17: 2 dose Thyrogen TBS followed by 131I 101 mCi on 4/13/17 ([facility] with abnormal post therapy TBS  raising questions about the left lower quadrant, including left pelvis or GI.    I last saw her via telephone visit 1/12/18. At that time she was on  Synthroid 150 mcg/day. I ordered neck US for before the appt today, but it was not scheduled.  She has not had the study.       We have the following imaging reports:   4/13/17: 123I 6.6 mCi total body scan (Thyrogen stimulated; [facility]) moderate uptake thyroid bed.   4/13/17: 101 mCi 131I  4/21/17 post treatment TBS -raising questions about the left lower quadrant, including left pelvis or GI.    We have the following tumor marker data:   2/16/17 Surgery  4/13/17: Tg 0.1, SIMON 210.3  4/13/17: 101 mci 131I  4/21/17: Tg 4.2, SIMON 115, TSH 5.97  8/23/17 : Tg 3.7, SIMON 115 -  11/17 Operation # 2  1/15/18: TSH 0.36  1/30/18 Tg 3.1, SIMON 17, TSH not measured        images on PACS   4/13/17: 123I 6.6 mCi total body scan (Thyrogen stimulated; [facility]) moderate uptake thyroid bed.   4/13/17: 101 mCi 131I  4/21/17 post treatment TBS -raising questions about the left lower quadrant, including left pelvis or GI.  4/21/17: post therapy scan ; neck uptake.  I wouldn't have called - tiny focus left abdomen lateral  And also 2 foci midline pelvis- one I would attribute to bladder and the other ?   4/28/17 CT pelvis -sclerotic bone lesions as described by radiologist.do not correspond to likely location of 131I uptake seen on outside TBS . Radiologist states some definitely represent bone islands but some are indeterminant .  5/5/17 bone scan: 2 areas noted by radiologist s- anterior column left acetabulum and right sacral body.    3/6/18 neck US (following appt- not discussed at appt):   Left thyroid bed level 6 # 1 0.5 x 0.3 x 0.7 cm hypoechoic mass  Left level 6 # 2 0.5 x 0.2 x 0.6 cm  Left level 2-4 cine has some small LNs with  Echogenic hilum      REVIEW OF SYSTEMS  Tired  Cardiac: negative  Respiratory: negative  GI: negative  No pain  No bone pain  No headaches      Past Medical History  Past Medical History:   Diagnosis Date     Papillary thyroid carcinoma (H) 02/06/2017     PONV (postoperative nausea and vomiting)      Post-surgical hypothyroidism 02/06/2017     Past Surgical History:   Procedure Laterality Date     EXPLORE NECK N/A 11/8/2017    Procedure: EXPLORE NECK;  Re-Operative Central Neck Dissection ;  Surgeon: Yanet Cordova MD;  Location: [facility] OR     total thyroidectomy  02/06/2017    [facility], Dr Espinoza Taylor       Medications    Current Outpatient Prescriptions   Medication Sig Dispense Refill     acetaminophen (TYLENOL) 325 MG tablet Take 2 tablets (650 mg) by mouth every 4 hours as needed for other (mild pain) 100 tablet 0     cholecalciferol (VITAMIN D3) 5000 UNITS CAPS capsule Take by mouth daily       SYNTHROID 150 MCG tablet Take 1 tablet (150 mcg) by mouth daily 90 tablet 3     levonorgestrel (MIRENA) 20 MCG/24HR IUD          Social History  Social History   Substance Use Topics     Smoking status: Never Smoker     Smokeless tobacco: Never Used     Alcohol use No     Avoids gluten;     EXAM  GENERAL: quiet woman in NAD  There were no vitals taken for this visit.  SKIN: normal color, temperature, texture   HEENT: PER, no scleral icterus, eyelid retraction, stare, lid lag, proptosis or conjunctival injection.    NECK: supple.  No visible or palpable masses.   LUNGS: clear to auscultation bilaterally.   CARDIAC: RRR, S1, S2 without murmurs, rubs or gallops.     NEURO: Alert, responds appropriately to questions,moves all extremities, DTRs 2/4, gait normal, no tremor of the outstretched hand      Results for SILVA RHIANNON (MRN [id number]) as of 3/6/2018 09:48   Ref. Range 11/8/2017 16:34 1/15/2018 10:27 1/30/2018 16:16   Calcium Latest Ref Range: 8.5 - 10.1 mg/dL  8.6    Phosphorus Latest Ref Range:  2.5 - 4.5 mg/dL  2.6    T4 Free Latest Ref Range: 0.76 - 1.46 ng/dL  1.69 (H)    TSH Latest Ref Range: 0.40 - 4.00 mU/L  0.36 (L)    Parathyroid Hormone Intact Latest Ref Range: 12 - 72 pg/mL 14     Lab Scanned Result Unknown   THYROG-Scanned (A)       EXAMINATION: TEMPORARY, 3/6/2018 3:54 PM      COMPARISON: 8/28/2017     HISTORY: Papillary thyroid cancer     FINDINGS:   Two small lymph nodes seen on the left level 6, status post lymph node  dissection.     Lymph nodes are measured bilaterally with measurements given in  craniocaudal, transverse and AP dimensions as follows:     Right:  No enlarged lymph nodes by size criteria     Left:  At neck level 6 is a 5 x 3 x 7 mm and a 5 x 2 x 6 mm node/nodule, both  which are ovoid, and are otherwise too small to further characterize.  No definite internal vascularity. Previously these measured 6 x 3 x 6  cm and 4 x 2 x 5 mm respectively No other enlarged lymph nodes in the  remainder of the neck by size criteria.             IMPRESSION:  Two unchanged left level 6 lymph nodes/nodules, measuring 7 mm and 6  mm.     I have personally reviewed the examination and initial interpretation  and I agree with the findings.     ANASTACIO EVANS MD

## 2018-03-06 NOTE — LETTER
3/6/2018       RE: Joy Quispe  [address] [place] [address]     Dear Colleague,    Thank you for referring your patient, Joy Quispe, to the [facility] ENDOCRINOLOGY at [facility]. Please see a copy of my visit note below.    Endocrine Consult note    Attending Assessment/Plan :     1.  Papillary thyroid carcinoma, unifocal 2.5 cm, isthmus region, LN + including bulky notes up to 1.8 cm with extranodal extension  pT2, pN1, pMx, possible cM1 but this is very unclear .  MACIS3.95 best case, and 7.95 worst case if she has distant mets and incomplete resection  She has had 2 neck operations, 131I Rx x 1.    SIMON has been dropping rapidly, which is good.  Neck US    RTC 4 months    2.  Post surgical hypothyroidism. Labs today. Treat to TSH < 0.4.    Unstable.  She is now on Synthroid 150 mcg/day.     3.  Abnormal  post therapy TBS performed at [facility]  - as per # 4.  We have questions from the lst 131I TBS which will cause us to repeat the study this year. Discussed protocol - I have asked her to let me know when we should try to do this.   I have given her low iodine list for future reference.    4.  Abnormal bone CT, abnormal bone scan .  A Right sacral ala on bone scan  - sclerotic on CT coronal per my review of images  B) left acetabular on bone scan, sclerotic- - this area is likely too low to correspond to what was seen on 131I .    Late SPECT overlay 131I TBS at [facility] did not show abnormal uptake but the signal was lower by then.  This does not fully exclude the possibility of bone mets.    5.  Thyroglobulin antibody present      Katy Bah MD    Cc/ HPI :  Joy presents, along with her , for follow up of papillary thyroid carcinoma, post surgical hypothyroidism, abnormal bone imaging.     Her treatment course has been as follows:  2 operations for the thyroid cancer:   2/6/17 total thyroidectomy  (Dr Espinoza Taylor, [facility]) removing unifocal  2.5 cm Papillary thyroid carcinoma, isthmus with extension into right and left lobes.  3/6 lymph nodes were + for metastatic thyroid cancer.  Background lymphocytic thyroiditis.   11/8/17: reoperative CND level 6 and 7 ([facility])- 5 nodes + for PCT - largest 1.8 cm, extranodal extension present, focal    4/13/17: 2 dose Thyrogen TBS followed by 131I 101 mCi on 4/13/17 ([facility] with abnormal post therapy TBS  raising questions about the left lower quadrant, including left pelvis or GI.    I last saw her via telephone visit 1/12/18. At that time she was on  Synthroid 150 mcg/day. I ordered neck US for before the appt today, but it was not scheduled.  She has not had the study.     We have the following imaging reports:   4/13/17: 123I 6.6 mCi total body scan (Thyrogen stimulated; [facility]) moderate uptake thyroid bed.   4/13/17: 101 mCi 131I  4/21/17 post treatment TBS -raising questions about the left lower quadrant, including left pelvis or GI.    We have the following tumor marker data:   2/16/17 Surgery  4/13/17: Tg 0.1, SIMON 210.3  4/13/17: 101 mci 131I  4/21/17: Tg 4.2, SIMON 115, TSH 5.97  8/23/17 : Tg 3.7, SIMON 115 -  11/17 Operation # 2  1/15/18: TSH 0.36  1/30/18 Tg 3.1, SIMON 17, TSH not measured        images on PACS   4/13/17: 123I 6.6 mCi total body scan (Thyrogen stimulated; [facility]) moderate uptake thyroid bed.   4/13/17: 101 mCi 131I  4/21/17 post treatment TBS -raising questions about the left lower quadrant, including left pelvis or GI.  4/21/17: post therapy scan ; neck uptake.  I wouldn't have called - tiny focus left abdomen lateral  And also 2 foci midline pelvis- one I would attribute to bladder and the other ?   4/28/17 CT pelvis -sclerotic bone lesions as described by radiologist.do not correspond to likely location of 131I uptake seen on outside TBS . Radiologist states some definitely represent bone islands but some are indeterminant .  5/5/17 bone scan: 2 areas noted by radiologist  s- anterior column left acetabulum and right sacral body.    3/6/18 neck US (following appt- not discussed at appt):   Left thyroid bed level 6 # 1 0.5 x 0.3 x 0.7 cm hypoechoic mass  Left level 6 # 2 0.5 x 0.2 x 0.6 cm  Left level 2-4 cine has some small LNs with Echogenic hilum      REVIEW OF SYSTEMS  Tired  Cardiac: negative  Respiratory: negative  GI: negative  No pain  No bone pain  No headaches    Past Medical History  Past Medical History:   Diagnosis Date     Papillary thyroid carcinoma (H) 02/06/2017     PONV (postoperative nausea and vomiting)      Post-surgical hypothyroidism 02/06/2017     Past Surgical History:   Procedure Laterality Date     EXPLORE NECK N/A 11/8/2017    Procedure: EXPLORE NECK;  Re-Operative Central Neck Dissection ;  Surgeon: Yanet Cordova MD;  Location: [facility] OR     total thyroidectomy  02/06/2017    [facility], Dr Espinoza Taylor       Medications    Current Outpatient Prescriptions   Medication Sig Dispense Refill     acetaminophen (TYLENOL) 325 MG tablet Take 2 tablets (650 mg) by mouth every 4 hours as needed for other (mild pain) 100 tablet 0     cholecalciferol (VITAMIN D3) 5000 UNITS CAPS capsule Take by mouth daily       SYNTHROID 150 MCG tablet Take 1 tablet (150 mcg) by mouth daily 90 tablet 3     levonorgestrel (MIRENA) 20 MCG/24HR IUD        Social History  Social History   Substance Use Topics     Smoking status: Never Smoker     Smokeless tobacco: Never Used     Alcohol use No     Avoids gluten;     EXAM  GENERAL: quiet woman in NAD  There were no vitals taken for this visit.  SKIN: normal color, temperature, texture   HEENT: PER, no scleral icterus, eyelid retraction, stare, lid lag, proptosis or conjunctival injection.    NECK: supple.  No visible or palpable masses.   LUNGS: clear to auscultation bilaterally.   CARDIAC: RRR, S1, S2 without murmurs, rubs or gallops.     NEURO: Alert, responds appropriately to questions,moves all extremities, DTRs 2/4,  gait normal, no tremor of the outstretched hand      Results for RHIANNON SILVA (MRN [id number]) as of 3/6/2018 09:48   Ref. Range 11/8/2017 16:34 1/15/2018 10:27 1/30/2018 16:16   Calcium Latest Ref Range: 8.5 - 10.1 mg/dL  8.6    Phosphorus Latest Ref Range: 2.5 - 4.5 mg/dL  2.6    T4 Free Latest Ref Range: 0.76 - 1.46 ng/dL  1.69 (H)    TSH Latest Ref Range: 0.40 - 4.00 mU/L  0.36 (L)    Parathyroid Hormone Intact Latest Ref Range: 12 - 72 pg/mL 14     Lab Scanned Result Unknown   THYROG-Scanned (A)     EXAMINATION: TEMPORARY, 3/6/2018 3:54 PM      COMPARISON: 8/28/2017     HISTORY: Papillary thyroid cancer     FINDINGS:   Two small lymph nodes seen on the left level 6, status post lymph node  dissection.     Lymph nodes are measured bilaterally with measurements given in  craniocaudal, transverse and AP dimensions as follows:     Right:  No enlarged lymph nodes by size criteria     Left:  At neck level 6 is a 5 x 3 x 7 mm and a 5 x 2 x 6 mm node/nodule, both  which are ovoid, and are otherwise too small to further characterize.  No definite internal vascularity. Previously these measured 6 x 3 x 6  cm and 4 x 2 x 5 mm respectively No other enlarged lymph nodes in the  remainder of the neck by size criteria.     IMPRESSION:  Two unchanged left level 6 lymph nodes/nodules, measuring 7 mm and 6  mm.     I have personally reviewed the examination and initial interpretation  and I agree with the findings.     ANASTACIO EVANS MD    Sincerely,  Katy Bah MD

## 2018-03-06 NOTE — NURSING NOTE
"Chief Complaint   Patient presents with     RECHECK     hASHIMOTOS       Initial /70  Pulse 88  Ht 1.71 m (5' 7.32\")  Wt 76.5 kg (168 lb 9.6 oz)  BMI 26.15 kg/m2 Estimated body mass index is 26.15 kg/(m^2) as calculated from the following:    Height as of this encounter: 1.71 m (5' 7.32\").    Weight as of this encounter: 76.5 kg (168 lb 9.6 oz).  Medication Reconciliation: complete    "

## 2018-03-06 NOTE — MR AVS SNAPSHOT
After Visit Summary   3/6/2018    Joy Quispe    MRN: [id number]           Patient Information     Date Of Birth          1975        Visit Information        Provider Department      3/6/2018 3:00 PM Katy Bah MD [facility] End[facility]        Today's Diagnoses     Papillary thyroid carcinoma (H)    -  1    Postsurgical hypothyroidism        Thyroglobulin antibody positive        Abnormal radionuclide bone scan          Care Instructions    You tell me when it would be convenient to do another total body scan.    Here is an example schedule.   Wednesday 2 weeks before the test - Start low iodine diet < 50 mcg/day.   On the week of the test  Monday Thyrogen - this is one very expensive shot.  Given in the clinic [facility]  Tuesday Thyrogen  This is one very expensive shot.  Given in the clinic [facility]  Wednesday Report to Nuclear Medicine ([facility] 2nd fl)  for Radioiodine dose  This is a diagnostic dose of 123I.  This does not require radiation isolation afterwards.   Thursday Report to Nuclear medicine ([facility] 2nd floor)  for total body scan   Friday Lab ([facility] lst floor)  Work can be any time.  Stop low iodine diet.           Follow-ups after your visit        Follow-up notes from your care team     Return in about 4 months (around 7/6/2018).      Your next 10 appointments already scheduled     Jul 16, 2018  3:20 PM CDT   (Arrive by 3:05 PM)   RETURN ENDOCRINE with Katy Bah MD   [facility] Endocrinology ([facility])    [address] [place] [address] [phone]              Who to contact     Please call your clinic at [phone] to:    Ask questions about your health    Make or cancel appointments    Discuss your medicines    Learn about your test results    Speak to your doctor            Additional Information About Your Visit        MyChart Information     [employer] gives you secure access to your electronic health record. If you  "see a primary care provider, you can also send messages to your care team and make appointments. If you have questions, please call your primary care clinic.  If you do not have a primary care provider, please call [phone] and they will assist you.      [employer] is an electronic gateway that provides easy, online access to your medical records. With [employer], you can request a clinic appointment, read your test results, renew a prescription or communicate with your care team.     To access your existing account, please contact your [facility] or call [phone] for assistance.        Care EveryWhere ID     This is your Care EveryWhere ID. This could be used by other organizations to access your [facility] medical records  [id number]        Your Vitals Were     Pulse Height BMI (Body Mass Index)             88 1.71 m (5' 7.32\") 26.15 kg/m2          Blood Pressure from Last 3 Encounters:   03/06/18 120/70   11/08/17 111/79   08/23/17 107/70    Weight from Last 3 Encounters:   03/06/18 76.5 kg (168 lb 9.6 oz)   11/08/17 74 kg (163 lb 2.3 oz)   10/02/17 75.8 kg (167 lb)              Today, you had the following     No orders found for display       Primary Care Provider Office Phone # Fax #    Valeri Field [phone]       [facility] [address]  [facility] [address]        Equal Access to Services     [facility] : Hadii chandan ku alexio Soheidy, waaxda luqadaha, qaybta kaalmada adeegyada, munir new. So [facility] [phone].    ATENCIÓN: Si habla español, tiene a royal disposición servicios gratuitos de asistencia lingüística. Llame al [phone].    We comply with applicable federal civil rights laws and [place] laws. We do not discriminate on the basis of race, color, national origin, age, disability, sex, sexual orientation, or gender identity.            Thank you!     Thank you for choosing [facility] ENDOCRINOLOGY  " for your care. Our goal is always to provide you with excellent care. Hearing back from our patients is one way we can continue to improve our services. Please take a few minutes to complete the written survey that you may receive in the mail after your visit with us. Thank you!             Your Updated Medication List - Protect others around you: Learn how to safely use, store and throw away your medicines at www.disposemymeds.org.          This list is accurate as of 3/6/18 11:59 PM.  Always use your most recent med list.                   Brand Name Dispense Instructions for use Diagnosis    acetaminophen 325 MG tablet    TYLENOL    100 tablet    Take 2 tablets (650 mg) by mouth every 4 hours as needed for other (mild pain)    Papillary thyroid carcinoma (H)       cholecalciferol 5000 UNITS Caps capsule    vitamin D3     Take by mouth daily    Papillary thyroid carcinoma (H), Postsurgical hypothyroidism, Bone lesion       levonorgestrel 20 MCG/24HR IUD    MIRENA      Papillary thyroid carcinoma (H), Postsurgical hypothyroidism       SYNTHROID 150 MCG tablet   Generic drug:  levothyroxine     90 tablet    Take 1 tablet (150 mcg) by mouth daily    Papillary thyroid carcinoma (H), Postsurgical hypothyroidism, Bone lesion

## 2018-03-06 NOTE — PATIENT INSTRUCTIONS
Orders    TOTAL BODY RADIOIODINE SCAN - THYROGEN 2 DOSE, 123I    Diagnosis:C73    Start low iodine diet < 50 mcg/day.                        Wednesday, 3/21/18  (low iodine recipes at www.thyca.org)  Continue to take your usual dose of thyroid medication    Thyrogen 0.9 mg IM Dose#1                    Monday, 4/2/18 , 1:00 PM  Endocrine clinic -3rd floor [facility]    Thyrogen 0.9 mg IM Dose#2                 Tuesday, 4/3/18 , 1:00 PM    Lab work: HCG   serum pregnancy test     Tuesday , 4/3/18 , 1:15 PM   (For women of childbearing age)  Endocrine clinic -1st floor [facility]  ( [address] )     Report to Nuclear Medicine for Radioiodine dose        Wednesday, 4/4/18, 10:00 AM   2nd Floor [facility], [address]                  Report to Nuclear medicine for total body scan        Thursday, 4/5/18 , 10:00 AM   2nd [facility]  ( [address])     Lab Work:   Thyroglobulin, TSH                          Friday, 4/6/18  , 10:00 AM   Laboratory- [facility] floor [facility]  ([address] )

## 2018-03-12 DIAGNOSIS — C73 PAPILLARY THYROID CARCINOMA (H): Primary | ICD-10-CM

## 2018-03-12 DIAGNOSIS — R94.8 ABNORMAL RADIONUCLIDE BONE SCAN: ICD-10-CM

## 2018-03-12 DIAGNOSIS — E89.0 POSTSURGICAL HYPOTHYROIDISM: ICD-10-CM

## 2018-03-12 NOTE — PROGRESS NOTES
Orders    TOTAL BODY RADIOIODINE SCAN - THYROGEN 2 DOSE, 123I    Diagnosis:    Start low iodine diet < 50 mcg/day.                                              Wednesday, 3/21/18  (low iodine recipes at www.thyca.org)  Continue to take your usual dose of thyroid medication    Women of childbearing age: Pregnancy test is required within 24 hrs of receiving radioactive iodine dose but you should be certain you are not pregnant prior to Thyrogen injections.     Thyrogen 0.9 mg IM Dose#1                                                            Monday, 4/2/18  Endocrine clinic -3rd floor [facility]    Thyrogen 0.9 mg IM Dose#2                                                           Tuesday, 4/3/18  Endocrine clinic -3rd floor [facility]    Lab work:   serum pregnancy test    Tuesday 4/3/18  (For women of childbearing age)    Report to Nuclear Medicine for Radioiodine dose                       Wednesday, 4/4/18  2nd Floor [facility]                Report to Nuclear medicine for total body scan                      Thursday, 4/5/18    Lab Work:   Thyroglobulin, TSH                                           Friday, 4/6/18  Laboratory- [facility] floor [facility]    Send copy to Nuclear Medicine via fax.  Copy to Patient

## 2018-04-02 ENCOUNTER — ALLIED HEALTH/NURSE VISIT (OUTPATIENT)
Dept: ENDOCRINOLOGY | Facility: CLINIC | Age: 43
End: 2018-04-02
Payer: COMMERCIAL

## 2018-04-02 DIAGNOSIS — C73 PAPILLARY THYROID CARCINOMA (H): Primary | ICD-10-CM

## 2018-04-02 NOTE — MR AVS SNAPSHOT
After Visit Summary   4/2/2018    Joy Quispe    MRN: [id number]           Patient Information     Date Of Birth          1975        Visit Information        Provider Department      4/2/2018 1:00 PM Nurse, [facility] Endocrinology        Today's Diagnoses     Papillary thyroid carcinoma (H)    -  1       Follow-ups after your visit        Your next 10 appointments already scheduled     Apr 03, 2018  1:00 PM CDT   Nurse Visit with [facility] Endocrinology ([facility])    [address]  [address] [facility] [id number] [phone]            Apr 03, 2018  1:15 PM CDT   LAB with  LAB   [facility] Lab ([facility])    [address]  [address] [facility] [id number] [phone]           Please do not eat 10-12 hours before your appointment if you are coming in fasting for labs on lipids, cholesterol, or glucose (sugar). This does not apply to pregnant women. Water, hot tea and black coffee (with nothing added) are okay. Do not drink other fluids, diet soda or chew gum.            Apr 04, 2018 10:00 AM CDT   (Arrive by 9:45 AM)   NM INJECTION with UUNMINJ1   [facility], Nuclear Medicine ([facility])    [address] [id number] [phone]            Apr 05, 2018 10:00 AM CDT   (Arrive by 9:45 AM)   NM THYROID WH BDY SCAN I 123 with UUNM2   [facility], Nuclear Medicine ([facility])    [address] [id number] [phone]           Please bring a list of your medicines to the exam. (Include vitamins, minerals and over-the-counter drugs.) You should wear comfortable clothes. Leave your valuables at home. Please bring related prior results and films.  Tell your doctor:   If you are breastfeeding or may be pregnant.   If you have had a test including barium within  the past 48 hours. Barium may change the results of certain exams.   If you think you may need sedation (medicine to help you relax).  This exam cannot be performed if you have had another imaging test including iodinated contrast within the past 30 days.  You may eat and drink as normal.  If you take thyroid medicine, you must talk to your doctor about stopping it. Your doctor may have you stop taking it 3 to 6 weeks before your exam.  Please call your Imaging Department at your exam site with any questions.            Apr 06, 2018 10:00 AM CDT   LAB with  LAB   [facility] Lab ([facility])    [address] [place] [address] [phone]           Please do not eat 10-12 hours before your appointment if you are coming in fasting for labs on lipids, cholesterol, or glucose (sugar). This does not apply to pregnant women. Water, hot tea and black coffee (with nothing added) are okay. Do not drink other fluids, diet soda or chew gum.            Jul 16, 2018  3:20 PM CDT   (Arrive by 3:05 PM)   RETURN ENDOCRINE with Katy Bah MD   [facility] Endocrinology ([facility])    [address] [place] [address] [phone]              Who to contact     Please call your clinic at [phone] to:    Ask questions about your health    Make or cancel appointments    Discuss your medicines    Learn about your test results    Speak to your doctor            Additional Information About Your Visit        IDbyME Information     IDbyME gives you secure access to your electronic health record. If you see a primary care provider, you can also send messages to your care team and make appointments. If you have questions, please call your primary care clinic.  If you do not have a primary care provider, please call [phone] and they will assist you.      IDbyME is an electronic gateway that provides easy, online access  to your medical records. With [employer], you can request a clinic appointment, read your test results, renew a prescription or communicate with your care team.     To access your existing account, please contact your [facility] or call [phone] for assistance.        Care EveryWhere ID     This is your Care EveryWhere ID. This could be used by other organizations to access your [place] medical records  [id number]         Blood Pressure from Last 3 Encounters:   03/06/18 120/70   11/08/17 111/79   08/23/17 107/70    Weight from Last 3 Encounters:   03/06/18 76.5 kg (168 lb 9.6 oz)   11/08/17 74 kg (163 lb 2.3 oz)   10/02/17 75.8 kg (167 lb)              Today, you had the following     No orders found for display       Primary Care Provider Office Phone # Fax #    Valeri Field [phone]       [facility] [address]  [facility] [address]        Equal Access to Services     MAYA DEAN : Hadii aad ku hadasho Soomaali, waaxda luqadaha, qaybta kaalmada adeegyada, waxay idiin hayjordonn earlene crawford . So [facility] [phone].    ATENCIÓN: Si habla español, tiene a royal disposición servicios gratuitos de asistencia lingüística. Llame al [phone].    We comply with applicable federal civil rights laws and [place] laws. We do not discriminate on the basis of race, color, national origin, age, disability, sex, sexual orientation, or gender identity.            Thank you!     Thank you for choosing [facility] ENDOCRINOLOGY  for your care. Our goal is always to provide you with excellent care. Hearing back from our patients is one way we can continue to improve our services. Please take a few minutes to complete the written survey that you may receive in the mail after your visit with us. Thank you!             Your Updated Medication List - Protect others around you: Learn how to safely use, store and throw away your medicines at  www.disposemymeds.org.          This list is accurate as of 4/2/18  2:17 PM.  Always use your most recent med list.                   Brand Name Dispense Instructions for use Diagnosis    acetaminophen 325 MG tablet    TYLENOL    100 tablet    Take 2 tablets (650 mg) by mouth every 4 hours as needed for other (mild pain)    Papillary thyroid carcinoma (H)       cholecalciferol 5000 UNITS Caps capsule    vitamin D3     Take by mouth daily    Papillary thyroid carcinoma (H), Postsurgical hypothyroidism, Bone lesion       levonorgestrel 20 MCG/24HR IUD    MIRENA      Papillary thyroid carcinoma (H), Postsurgical hypothyroidism       SYNTHROID 150 MCG tablet   Generic drug:  levothyroxine     90 tablet    Take 1 tablet (150 mcg) by mouth daily    Papillary thyroid carcinoma (H), Postsurgical hypothyroidism, Bone lesion       thyrotropin 1.1 MG Solr injection    THYROGEN    1 mL    Inject Thyrogen 0.9 mg IM every 24 hrs x 2 doses    Papillary thyroid carcinoma (H), Postsurgical hypothyroidism, Abnormal radionuclide bone scan

## 2018-04-02 NOTE — NURSING NOTE
The following medication was given:     MEDICATION: Thyrogen 0.9 mg/mL  ROUTE: IM  SITE: RUQ - Gluteus  DOSE: 0.9 mg/mL  LOT #: [id number]  :  [employer]  EXPIRATION DATE:  MAY 2019  NDC#: [address]-1       PATIENT TOLERATED INJECTION WELL. LEFT WITHOUT ANY CONCERNS OR QUESTIONS

## 2018-04-03 ENCOUNTER — ALLIED HEALTH/NURSE VISIT (OUTPATIENT)
Dept: ENDOCRINOLOGY | Facility: CLINIC | Age: 43
End: 2018-04-03
Payer: COMMERCIAL

## 2018-04-03 DIAGNOSIS — R94.8 ABNORMAL RADIONUCLIDE BONE SCAN: ICD-10-CM

## 2018-04-03 DIAGNOSIS — C73 PAPILLARY THYROID CARCINOMA (H): Primary | ICD-10-CM

## 2018-04-03 DIAGNOSIS — E89.0 POSTSURGICAL HYPOTHYROIDISM: ICD-10-CM

## 2018-04-03 DIAGNOSIS — C73 PAPILLARY THYROID CARCINOMA (H): ICD-10-CM

## 2018-04-03 LAB — HCG SERPL QL: NEGATIVE

## 2018-04-03 NOTE — NURSING NOTE
Administered Thyrogen injection #2- patient tolerated injection well.      The following medication was given:     MEDICATION: Thyrogen  ROUTE: IM  SITE: LUQ - Gluteus  DOSE: 0.9 mg/mL  LOT #: [id number]  :  [employer]  EXPIRATION DATE:  MAY 2019  NDC#: [address]-9

## 2018-04-03 NOTE — MR AVS SNAPSHOT
After Visit Summary   4/3/2018    Joy Quispe    MRN: [id number]           Patient Information     Date Of Birth          1975        Visit Information        Provider Department      4/3/2018 1:00 PM Nurse, [facility] Endocrinology         Follow-ups after your visit        Your next 10 appointments already scheduled     Apr 03, 2018  1:15 PM CDT   LAB with  LAB    [facility] ([facility])    [address]  [address] [facility] [id number] [phone]           Please do not eat 10-12 hours before your appointment if you are coming in fasting for labs on lipids, cholesterol, or glucose (sugar). This does not apply to pregnant women. Water, hot tea and black coffee (with nothing added) are okay. Do not drink other fluids, diet soda or chew gum.            Apr 04, 2018 10:00 AM CDT   (Arrive by 9:45 AM)   NM INJECTION with UUNMINJ1   [facility], Nuclear Medicine ([facility] [place], [facility])    [address] [id number] [phone]            Apr 05, 2018 10:00 AM CDT   (Arrive by 9:45 AM)   NM THYROID WH BDY SCAN I 123 with UUNM2   [facility], Nuclear Medicine ([facility] [place], [facility])    [address] [address] [phone]           Please bring a list of your medicines to the exam. (Include vitamins, minerals and over-the-counter drugs.) You should wear comfortable clothes. Leave your valuables at home. Please bring related prior results and films.  Tell your doctor:   If you are breastfeeding or may be pregnant.   If you have had a test including barium within the past 48 hours. Barium may change the results of certain exams.   If you think you may need sedation (medicine to help you relax).  This exam cannot be performed if you have had another imaging test including iodinated contrast within the past 30 days.  You may eat and  drink as normal.  If you take thyroid medicine, you must talk to your doctor about stopping it. Your doctor may have you stop taking it 3 to 6 weeks before your exam.  Please call your Imaging Department at your exam site with any questions.            Apr 06, 2018 10:00 AM CDT   LAB with  LAB   [facility] ([facility])    [address] [place] [id number] [phone]           Please do not eat 10-12 hours before your appointment if you are coming in fasting for labs on lipids, cholesterol, or glucose (sugar). This does not apply to pregnant women. Water, hot tea and black coffee (with nothing added) are okay. Do not drink other fluids, diet soda or chew gum.            Jul 16, 2018  3:20 PM CDT   (Arrive by 3:05 PM)   RETURN ENDOCRINE with Katy Bah MD   [facility] Endocrinology ([facility])    [address] [place] [address] [phone]              Who to contact     Please call your clinic at [phone] to:    Ask questions about your health    Make or cancel appointments    Discuss your medicines    Learn about your test results    Speak to your doctor            Additional Information About Your Visit        BeavEx Information     [employer] gives you secure access to your electronic health record. If you see a primary care provider, you can also send messages to your care team and make appointments. If you have questions, please call your primary care clinic.  If you do not have a primary care provider, please call [phone] and they will assist you.      Be[employer]Ex is an electronic gateway that provides easy, online access to your medical records. With [employer], you can request a clinic appointment, read your test results, renew a prescription or communicate with your care team.     To access your existing account, please contact your [facility] or call  [phone] for assistance.        Care EveryWhere ID     This is your Care EveryWhere ID. This could be used by other organizations to access your [place] medical records  [id number]         Blood Pressure from Last 3 Encounters:   03/06/18 120/70   11/08/17 111/79   08/23/17 107/70    Weight from Last 3 Encounters:   03/06/18 76.5 kg (168 lb 9.6 oz)   11/08/17 74 kg (163 lb 2.3 oz)   10/02/17 75.8 kg (167 lb)              Today, you had the following     No orders found for display       Primary Care Provider Office Phone # Fax #    Valeri Field [phone]       [facility] [address]  [facility] [address]        Equal Access to Services     [facility]: Hadii chandan park hadasho Soomaali, waaxda luqadaha, qaybta kaalmada aderiveryamillie, munir crawford . So [facility] [phone].    ATENCIÓN: Si habla español, tiene a royal disposición servicios gratuitos de asistencia lingüística. Todd al [phone].    We comply with applicable federal civil rights laws and [place] laws. We do not discriminate on the basis of race, color, national origin, age, disability, sex, sexual orientation, or gender identity.            Thank you!     Thank you for choosing [facility]  for your care. Our goal is always to provide you with excellent care. Hearing back from our patients is one way we can continue to improve our services. Please take a few minutes to complete the written survey that you may receive in the mail after your visit with us. Thank you!             Your Updated Medication List - Protect others around you: Learn how to safely use, store and throw away your medicines at www.disposemymeds.org.          This list is accurate as of 4/3/18  1:12 PM.  Always use your most recent med list.                   Brand Name Dispense Instructions for use Diagnosis    acetaminophen 325 MG tablet    TYLENOL    100 tablet    Take 2 tablets (650 mg) by mouth every 4  hours as needed for other (mild pain)    Papillary thyroid carcinoma (H)       cholecalciferol 5000 UNITS Caps capsule    vitamin D3     Take by mouth daily    Papillary thyroid carcinoma (H), Postsurgical hypothyroidism, Bone lesion       levonorgestrel 20 MCG/24HR IUD    MIRENA      Papillary thyroid carcinoma (H), Postsurgical hypothyroidism       SYNTHROID 150 MCG tablet   Generic drug:  levothyroxine     90 tablet    Take 1 tablet (150 mcg) by mouth daily    Papillary thyroid carcinoma (H), Postsurgical hypothyroidism, Bone lesion       thyrotropin 1.1 MG Solr injection    THYROGEN    1 mL    Inject Thyrogen 0.9 mg IM every 24 hrs x 2 doses    Papillary thyroid carcinoma (H), Postsurgical hypothyroidism, Abnormal radionuclide bone scan

## 2018-04-04 ENCOUNTER — HOSPITAL ENCOUNTER (OUTPATIENT)
Dept: NUCLEAR MEDICINE | Facility: CLINIC | Age: 43
Setting detail: NUCLEAR MEDICINE
Discharge: HOME | End: 2018-04-05
Payer: COMMERCIAL

## 2018-04-04 DIAGNOSIS — R94.8 ABNORMAL RADIONUCLIDE BONE SCAN: ICD-10-CM

## 2018-04-04 DIAGNOSIS — E89.0 POSTSURGICAL HYPOTHYROIDISM: ICD-10-CM

## 2018-04-04 DIAGNOSIS — C73 PAPILLARY THYROID CARCINOMA (H): ICD-10-CM

## 2018-04-04 PROCEDURE — A9516 IODINE I-123 SOD IODIDE MIC: HCPCS

## 2018-04-04 PROCEDURE — 34300033 ZZH RX 343

## 2018-04-04 RX ADMIN — Medication 2.98 MCI.: at 10:10

## 2018-04-05 ENCOUNTER — MYC MEDICAL ADVICE (OUTPATIENT)
Dept: ENDOCRINOLOGY | Facility: CLINIC | Age: 43
End: 2018-04-05

## 2018-04-05 ENCOUNTER — HOSPITAL ENCOUNTER (OUTPATIENT)
Dept: NUCLEAR MEDICINE | Facility: CLINIC | Age: 43
Setting detail: NUCLEAR MEDICINE
End: 2018-04-05
Payer: COMMERCIAL

## 2018-04-05 PROCEDURE — 78018 THYROID MET IMAGING BODY: CPT

## 2018-04-06 DIAGNOSIS — R94.8 ABNORMAL RADIONUCLIDE BONE SCAN: ICD-10-CM

## 2018-04-06 DIAGNOSIS — E89.0 POSTSURGICAL HYPOTHYROIDISM: ICD-10-CM

## 2018-04-06 DIAGNOSIS — C73 PAPILLARY THYROID CARCINOMA (H): ICD-10-CM

## 2018-04-06 LAB — TSH SERPL DL<=0.005 MIU/L-ACNC: 23.8 MU/L (ref 0.4–4)

## 2018-04-06 NOTE — PROGRESS NOTES
Review of images from  123I TBS.  I agree with radiologists impression.   Katy Bah        Examination: Nuclear medicine I123 whole body scan on   4/5/2018 11:08  AM.     Clinical History:  ; Papillary thyroid carcinoma (H); Postsurgical  hypothyroidism; Abnormal radionuclide bone scan      Additional Information: none     Indication: Thyroid cancer, evaluate whole body distribution, pre  therapy.     Technique:     The patient received 2.98 mci of  orally. At 24 hours whole body  images were obtained as well as the SPECT CT images of the pelvis.        Findings:     The whole body images demonstrates there is physiologic uptake in the  stomach, colon and urinary bladder.      There is no tracer uptake associated with previously seen pelvic  lesions on 5/5/2017 dated bone which include sclerotic lesions in both  iliac bones and the sacrum.         Impression:   No abnormal tracer uptake associated with previously seen bony lesions  5/5/2017 dated bone scan to suggest metastatic disease. These are  unchanged since 4/20/2017 and could be treated metastasis vs  nonspecific bony lesions.        I have personally reviewed the examination and initial interpretation  and I agree with the findings.     JONA TREVINO MD    Addendum # 2 on 5/12/18 4/6/18 Tg 3, SIMON 9.4, TSH 23.8 day 5 thyrogen

## 2018-04-19 LAB — LAB SCANNED RESULT: ABNORMAL

## 2018-07-16 ENCOUNTER — OFFICE VISIT (OUTPATIENT)
Dept: ENDOCRINOLOGY | Facility: CLINIC | Age: 43
End: 2018-07-16
Payer: COMMERCIAL

## 2018-07-16 VITALS
SYSTOLIC BLOOD PRESSURE: 110 MMHG | HEIGHT: 68 IN | WEIGHT: 175.1 LBS | BODY MASS INDEX: 26.54 KG/M2 | HEART RATE: 75 BPM | DIASTOLIC BLOOD PRESSURE: 69 MMHG

## 2018-07-16 DIAGNOSIS — R76.8 THYROGLOBULIN ANTIBODY POSITIVE: ICD-10-CM

## 2018-07-16 DIAGNOSIS — E89.0 POSTSURGICAL HYPOTHYROIDISM: ICD-10-CM

## 2018-07-16 DIAGNOSIS — C73 PAPILLARY THYROID CARCINOMA (H): ICD-10-CM

## 2018-07-16 DIAGNOSIS — C73 PAPILLARY THYROID CARCINOMA (H): Primary | ICD-10-CM

## 2018-07-16 DIAGNOSIS — M89.9 BONE LESION: ICD-10-CM

## 2018-07-16 LAB
T4 FREE SERPL-MCNC: 1.49 NG/DL (ref 0.76–1.46)
TSH SERPL DL<=0.005 MIU/L-ACNC: 0.98 MU/L (ref 0.4–4)

## 2018-07-16 PROCEDURE — 99000 SPECIMEN HANDLING OFFICE-LAB: CPT

## 2018-07-16 PROCEDURE — 84443 ASSAY THYROID STIM HORMONE: CPT

## 2018-07-16 PROCEDURE — 86800 THYROGLOBULIN ANTIBODY: CPT | Mod: 90

## 2018-07-16 PROCEDURE — 36415 COLL VENOUS BLD VENIPUNCTURE: CPT

## 2018-07-16 PROCEDURE — 84439 ASSAY OF FREE THYROXINE: CPT

## 2018-07-16 PROCEDURE — 84432 ASSAY OF THYROGLOBULIN: CPT | Mod: 90

## 2018-07-16 PROCEDURE — 00000344 ZZHCL STATISTIC REMEASURE THYROGLOBULIN: Mod: 90

## 2018-07-16 RX ORDER — LEVOTHYROXINE SODIUM 150 MCG
TABLET ORAL
Qty: 96 TABLET | Refills: 3 | Status: SHIPPED | OUTPATIENT
Start: 2018-07-16 | End: 2019-02-12

## 2018-07-16 ASSESSMENT — PAIN SCALES - GENERAL: PAINLEVEL: NO PAIN (0)

## 2018-07-16 NOTE — PROGRESS NOTES
Endocrine Consult note    Attending Assessment/Plan :     1.  Papillary thyroid carcinoma, unifocal 2.5 cm, isthmus region, LN + including bulky notes up to 1.8 cm with extranodal extension  pT2, pN1, pMx, possible cM1 but this is very unclear .  MACIS3.95 best case, and 7.95 worst case if she has distant mets and incomplete resection  She has had 2 neck operations, 131I Rx x 1.    SIMON has been dropping rapidly, which is good.    RTC 6 months    2.  Post surgical hypothyroidism. Labs today. Treat to TSH < 0.4.    Unstable.  She is now on Synthroid 150 mcg/day. Increase to 150 * 7.5/week, divided.  Repeat labs in 2 months    3.  Abnormal  post therapy TBS performed at [facility]  - as per # 4.  Normal 123I TBS here 4/18    4.  Abnormal bone CT, abnormal bone scan .  A Right sacral ala on bone scan  - sclerotic on CT coronal per my review of images  B) left acetabular on bone scan, sclerotic- - this area is likely too low to correspond to what was seen on 131I .    123I TBS 4/18 doesn't show uptake here, which goes against it being bone mets, but doesn't 100 % exclude it.    5.  Thyroglobulin antibody present  . As per # 1    Katy Bah MD      Cc/ HPI :  Joy presents, along with 2 daughters, for follow up of papillary thyroid carcinoma, post surgical hypothyroidism, abnormal bone imaging. I last saw her 3/6/18. Since then, we performed 2 dose thyrogen stimulated TBS.      Her treatment course has been as follows:  2 operations for the thyroid cancer:   2/6/17 total thyroidectomy  (Dr Espinoza Taylor, [facility]) removing unifocal 2.5 cm Papillary thyroid carcinoma, isthmus with extension into right and left lobes.  3/6 lymph nodes were + for metastatic thyroid cancer.  Background lymphocytic thyroiditis.   4/13/17: 2 dose Thyrogen TBS followed by 131I 101 mCi on 4/13/17 ([facility] with abnormal post therapy TBS  raising questions about the left lower quadrant, including left pelvis or GI.  11/8/17: reoperative  CND level 6 and 7 ([facility])- 5 nodes + for PCT - largest 1.8 cm, extranodal extension present, focal      We have the following tumor marker data:   2/16/17 Surgery  4/13/17: Tg 0.1, SIMON 210.3  4/13/17: 101 mci 131I  4/21/17: Tg 4.2, SIMON 115, TSH 5.97  8/23/17 : Tg 3.7, SIMON 115 -  11/17 Operation # 2  1/15/18: TSH 0.36  1/30/18 Tg 3.1, SIMON 17, TSH not measured  4/6/18 Tg 3, SIMON 9.4, TSH 23.8 day 5 thyrogen         images on PACS   4/13/17: 123I 6.6 mCi total body scan (Thyrogen stimulated; [facility]) moderate uptake thyroid bed.   4/13/17: 101 mCi 131I  4/21/17 post treatment TBS -raising questions about the left lower quadrant, including left pelvis or GI.  4/21/17: post therapy scan ; neck uptake.  I wouldn't have called - tiny focus left abdomen lateral  And also 2 foci midline pelvis- one I would attribute to bladder and the other ?   4/28/17 CT pelvis -sclerotic bone lesions as described by radiologist.do not correspond to likely location of 131I uptake seen on outside TBS . Radiologist states some definitely represent bone islands but some are indeterminant .  5/5/17 bone scan: 2 areas noted by radiologist s- anterior column left acetabulum and right sacral body.    3/6/18 neck US :   Left thyroid bed level 6 # 1 0.5 x 0.3 x 0.7 cm hypoechoic mass  Left level 6 # 2 0.5 x 0.2 x 0.6 cm  Left level 2-4 cine has some small LNs with Echogenic hilum  4/5/18 2.98 mCi 123I TBS (thyrogen stimulated):       REVIEW OF SYSTEMS  Still tired  Sleep is OK  Weight is up--   Cardiac: negative  Respiratory: negative  GI: negative  Neck and shoulder pains    Past Medical History  Past Medical History:   Diagnosis Date     Papillary thyroid carcinoma (H) 02/06/2017     PONV (postoperative nausea and vomiting)      Post-surgical hypothyroidism 02/06/2017     Past Surgical History:   Procedure Laterality Date     EXPLORE NECK N/A 11/8/2017    Procedure: EXPLORE NECK;  Re-Operative Central Neck Dissection ;  Surgeon:  Yanet Cordova MD;  Location: [facility] OR     total thyroidectomy  02/06/2017    [facility], Dr Espinoza Taylor       Medications    Current Outpatient Prescriptions   Medication Sig Dispense Refill     acetaminophen (TYLENOL) 325 MG tablet Take 2 tablets (650 mg) by mouth every 4 hours as needed for other (mild pain) 100 tablet 0     cholecalciferol (VITAMIN D3) 5000 UNITS CAPS capsule Take by mouth daily       levonorgestrel (MIRENA) 20 MCG/24HR IUD        SYNTHROID 150 MCG tablet Take 1 tablet (150 mcg) by mouth daily 90 tablet 3       Social History  Social History   Substance Use Topics     Smoking status: Never Smoker     Smokeless tobacco: Never Used     Alcohol use No     Avoids gluten; ; stressful job; hasn't been running; gluten free diet    EXAM  GENERAL: quiet woman in NAD  There were no vitals taken for this visit.  SKIN: normal color, temperature, texture   HEENT: PER, no scleral icterus, eyelid retraction, stare, lid lag, proptosis or conjunctival injection.    NECK: supple.  No visible or palpable masses.   LUNGS: clear to auscultation bilaterally.   CARDIAC: RRR, S1, S2 without murmurs, rubs or gallops.     NEURO: Alert, responds appropriately to questions,moves all extremities, DTRs 2/4, gait normal, no tremor of the outstretched hand      Results for RHIANNON SILVA (MRN [id number]) as of 7/16/2018 16:39   Ref. Range 7/16/2018 10:15   T4 Free Latest Ref Range: 0.76 - 1.46 ng/dL 1.49 (H)   TSH Latest Ref Range: 0.40 - 4.00 mU/L 0.98     Examination: Nuclear medicine I123 whole body scan on   4/5/2018 11:08  AM.Clinical History:  ; Papillary thyroid carcinoma (H); Postsurgicalhypothyroidism; Abnormal radionuclide bone scan   Additional Information: none Indication: Thyroid cancer, evaluate whole body distribution, pre  therapy.Technique:The patient received 2.98 mci of  orally. At 24 hours whole body images were obtained as well as the SPECT CT images of the pelvis.  Findings:The whole body  images demonstrates there is physiologic uptake in the  stomach, colon and urinary bladder. There is no tracer uptake associated with previously seen pelvic  lesions on 5/5/2017 dated bone which include sclerotic lesions in both  iliac bones and the sacrum.     Impression:   No abnormal tracer uptake associated with previously seen bony lesions  5/5/2017 dated bone scan to suggest metastatic disease. These are  unchanged since 4/20/2017 and could be treated metastasis vs  nonspecific bony lesions.    I have personally reviewed the examination and initial interpretation  and I agree with the findings.     JONA TREVINO MD

## 2018-07-16 NOTE — MR AVS SNAPSHOT
After Visit Summary   7/16/2018    Joy Quispe    MRN: [id number]           Patient Information     Date Of Birth          1975        Visit Information        Provider Department      7/16/2018 3:20 PM Katy Bah MD [facility]        Today's Diagnoses     Papillary thyroid carcinoma (H)    -  1    Postsurgical hypothyroidism        Bone lesion          Care Instructions    Increase the Synthroid from 150 mcg/day by 1/2 tablet per WEEK.  To do this  MON to SAT 1 tablet/day;  SUN 1.5 tablet  You should have labs to follow up on the change in about 2 months.  I am leaving orders for follow up labs on the medical record.  Please call [phone]  to schedule lab follow up testing as directed.  Alternatively, it can be drawn in any [facility] lab.        Continue to see me approximately every 6 months            Follow-ups after your visit        Follow-up notes from your care team     Return in about 6 months (around 1/16/2019).      Your next 10 appointments already scheduled     Jan 22, 2019  1:00 PM CST   (Arrive by 12:45 PM)   RETURN ENDOCRINE with Katy Bah MD   [facility] ([facility])    [address] [place], [place] [id number] [phone]              Future tests that were ordered for you today     Open Future Orders        Priority Expected Expires Ordered    TSH Routine 9/16/2018 7/16/2019 7/16/2018    T4 free Routine 9/16/2018 7/16/2019 7/16/2018            Who to contact     Please call your clinic at [phone] to:    Ask questions about your health    Make or cancel appointments    Discuss your medicines    Learn about your test results    Speak to your doctor            Additional Information About Your Visit        MyChart Information     [employer] gives you secure access to your electronic health record. If you see a primary care provider, you can also send messages to your care team and make  "appointments. If you have questions, please call your primary care clinic.  If you do not have a primary care provider, please call [phone] and they will assist you.      [employer] is an electronic gateway that provides easy, online access to your medical records. With [employer], you can request a clinic appointment, read your test results, renew a prescription or communicate with your care team.     To access your existing account, please contact your [facility] or call [phone] for assistance.        Care EveryWhere ID     This is your Care EveryWhere ID. This could be used by other organizations to access your [place] medical records  [id number]        Your Vitals Were     Pulse Height BMI (Body Mass Index)             75 1.715 m (5' 7.5\") 27.02 kg/m2          Blood Pressure from Last 3 Encounters:   07/16/18 110/69   03/06/18 120/70   11/08/17 111/79    Weight from Last 3 Encounters:   07/16/18 79.4 kg (175 lb 1.6 oz)   03/06/18 76.5 kg (168 lb 9.6 oz)   11/08/17 74 kg (163 lb 2.3 oz)                 Today's Medication Changes          These changes are accurate as of 7/16/18  3:53 PM.  If you have any questions, ask your nurse or doctor.               These medicines have changed or have updated prescriptions.        Dose/Directions    SYNTHROID 150 MCG tablet   This may have changed:    - how much to take  - how to take this  - when to take this  - additional instructions   Used for:  Papillary thyroid carcinoma (H), Postsurgical hypothyroidism, Bone lesion   Generic drug:  levothyroxine   Changed by:  Katy Bah MD        MON to SAT 1 tablet/day;SUN 1.5 tablet   Quantity:  96 tablet   Refills:  3            Where to get your medicines      These medications were sent to [facility]/pharmacy #[id number] - [place], [place] - [address] [place] [address]     Phone:  [phone]     SYNTHROID 150 MCG tablet                Primary " Care Provider Office Phone # Fax #    Valeri Field [phone]       [facility] [address]  [facility] [address]        Equal Access to Services     MAYA DEAN : Hadii aad ku hadmilliekyle Sinan, wajoaquínda luqadaha, qaybta kaalmada oumar, munir castle maykelriver medina laMayrmilo new. So [facility] [phone].    ATENCIÓN: Si habla español, tiene a royal disposición servicios gratuitos de asistencia lingüística. Llame al [phone].    We comply with applicable federal civil rights laws and [place] laws. We do not discriminate on the basis of race, color, national origin, age, disability, sex, sexual orientation, or gender identity.            Thank you!     Thank you for choosing [facility]  for your care. Our goal is always to provide you with excellent care. Hearing back from our patients is one way we can continue to improve our services. Please take a few minutes to complete the written survey that you may receive in the mail after your visit with us. Thank you!             Your Updated Medication List - Protect others around you: Learn how to safely use, store and throw away your medicines at www.disposemymeds.org.          This list is accurate as of 7/16/18  3:53 PM.  Always use your most recent med list.                   Brand Name Dispense Instructions for use Diagnosis    acetaminophen 325 MG tablet    TYLENOL    100 tablet    Take 2 tablets (650 mg) by mouth every 4 hours as needed for other (mild pain)    Papillary thyroid carcinoma (H)       cholecalciferol 5000 units Caps capsule    vitamin D3     Take by mouth daily    Papillary thyroid carcinoma (H), Postsurgical hypothyroidism, Bone lesion       levonorgestrel 20 MCG/24HR IUD    MIRENA      Papillary thyroid carcinoma (H), Postsurgical hypothyroidism       SYNTHROID 150 MCG tablet   Generic drug:  levothyroxine     96 tablet    MON to SAT 1 tablet/day;SUN 1.5 tablet    Papillary thyroid carcinoma (H),  Postsurgical hypothyroidism, Bone lesion

## 2018-07-16 NOTE — PATIENT INSTRUCTIONS
Increase the Synthroid from 150 mcg/day by 1/2 tablet per WEEK.  To do this  MON to SAT 1 tablet/day;  SUN 1.5 tablet  You should have labs to follow up on the change in about 2 months.  I am leaving orders for follow up labs on the medical record.  Please call [phone]  to schedule lab follow up testing as directed.  Alternatively, it can be drawn in any [facility] lab.        Continue to see me approximately every 6 months

## 2018-07-16 NOTE — LETTER
7/16/2018       RE: Joy Quispe  [address] [place] [address]     Dear Colleague,    Thank you for referring your patient, Joy Quispe, to the [facility] ENDOCR[facility] at [facility]. Please see a copy of my visit note below.    Endocrine Consult note    Attending Assessment/Plan :     1.  Papillary thyroid carcinoma, unifocal 2.5 cm, isthmus region, LN + including bulky notes up to 1.8 cm with extranodal extension  pT2, pN1, pMx, possible cM1 but this is very unclear .  MACIS3.95 best case, and 7.95 worst case if she has distant mets and incomplete resection  She has had 2 neck operations, 131I Rx x 1.    SIMON has been dropping rapidly, which is good.    RTC 6 months    2.  Post surgical hypothyroidism. Labs today. Treat to TSH < 0.4.    Unstable.  She is now on Synthroid 150 mcg/day. Increase to 150 * 7.5/week, divided.  Repeat labs in 2 months    3.  Abnormal  post therapy TBS performed at [facility]  - as per # 4.  Normal 123I TBS here 4/18    4.  Abnormal bone CT, abnormal bone scan .  A Right sacral ala on bone scan  - sclerotic on CT coronal per my review of images  B) left acetabular on bone scan, sclerotic- - this area is likely too low to correspond to what was seen on 131I .    123I TBS 4/18 doesn't show uptake here, which goes against it being bone mets, but doesn't 100 % exclude it.    5.  Thyroglobulin antibody present  . As per # 1    Katy Bah MD      Cc/ HPI :  Joy presents, along with 2 daughters, for follow up of papillary thyroid carcinoma, post surgical hypothyroidism, abnormal bone imaging. I last saw her 3/6/18. Since then, we performed 2 dose thyrogen stimulated TBS.      Her treatment course has been as follows:  2 operations for the thyroid cancer:   2/6/17 total thyroidectomy  (Dr Espinoza Taylor, [facility]) removing unifocal 2.5 cm Papillary thyroid carcinoma, isthmus with extension into right and left lobes.  3/6 lymph nodes were  + for metastatic thyroid cancer.  Background lymphocytic thyroiditis.   4/13/17: 2 dose Thyrogen TBS followed by 131I 101 mCi on 4/13/17 ([facility] with abnormal post therapy TBS  raising questions about the left lower quadrant, including left pelvis or GI.  11/8/17: reoperative CND level 6 and 7 (Josealisha)- 5 nodes + for PCT - largest 1.8 cm, extranodal extension present, focal      We have the following tumor marker data:   2/16/17 Surgery  4/13/17: Tg 0.1, SIMON 210.3  4/13/17: 101 mci 131I  4/21/17: Tg 4.2, SIMON 115, TSH 5.97  8/23/17 : Tg 3.7, SIMON 115 -  11/17 Operation # 2  1/15/18: TSH 0.36  1/30/18 Tg 3.1, SIMON 17, TSH not measured  4/6/18 Tg 3, SIMON 9.4, TSH 23.8 day 5 thyrogen         images on PACS   4/13/17: 123I 6.6 mCi total body scan (Thyrogen stimulated; [facility]) moderate uptake thyroid bed.   4/13/17: 101 mCi 131I  4/21/17 post treatment TBS -raising questions about the left lower quadrant, including left pelvis or GI.  4/21/17: post therapy scan ; neck uptake.  I wouldn't have called - tiny focus left abdomen lateral  And also 2 foci midline pelvis- one I would attribute to bladder and the other ?   4/28/17 CT pelvis -sclerotic bone lesions as described by radiologist.do not correspond to likely location of 131I uptake seen on outside TBS . Radiologist states some definitely represent bone islands but some are indeterminant .  5/5/17 bone scan: 2 areas noted by radiologist s- anterior column left acetabulum and right sacral body.    3/6/18 neck US :   Left thyroid bed level 6 # 1 0.5 x 0.3 x 0.7 cm hypoechoic mass  Left level 6 # 2 0.5 x 0.2 x 0.6 cm  Left level 2-4 cine has some small LNs with Echogenic hilum  4/5/18 2.98 mCi 123I TBS (thyrogen stimulated):       REVIEW OF SYSTEMS  Still tired  Sleep is OK  Weight is up--   Cardiac: negative  Respiratory: negative  GI: negative  Neck and shoulder pains    Past Medical History  Past Medical History:   Diagnosis Date     Papillary thyroid carcinoma  (H) 02/06/2017     PONV (postoperative nausea and vomiting)      Post-surgical hypothyroidism 02/06/2017     Past Surgical History:   Procedure Laterality Date     EXPLORE NECK N/A 11/8/2017    Procedure: EXPLORE NECK;  Re-Operative Central Neck Dissection ;  Surgeon: Yanet Cordova MD;  Location: [facility] OR     total thyroidectomy  02/06/2017    [facility], Dr Espinoza Taylor       Medications    Current Outpatient Prescriptions   Medication Sig Dispense Refill     acetaminophen (TYLENOL) 325 MG tablet Take 2 tablets (650 mg) by mouth every 4 hours as needed for other (mild pain) 100 tablet 0     cholecalciferol (VITAMIN D3) 5000 UNITS CAPS capsule Take by mouth daily       levonorgestrel (MIRENA) 20 MCG/24HR IUD        SYNTHROID 150 MCG tablet Take 1 tablet (150 mcg) by mouth daily 90 tablet 3       Social History  Social History   Substance Use Topics     Smoking status: Never Smoker     Smokeless tobacco: Never Used     Alcohol use No     Avoids gluten; ; stressful job; hasn't been running; gluten free diet    EXAM  GENERAL: quiet woman in NAD  There were no vitals taken for this visit.  SKIN: normal color, temperature, texture   HEENT: PER, no scleral icterus, eyelid retraction, stare, lid lag, proptosis or conjunctival injection.    NECK: supple.  No visible or palpable masses.   LUNGS: clear to auscultation bilaterally.   CARDIAC: RRR, S1, S2 without murmurs, rubs or gallops.     NEURO: Alert, responds appropriately to questions,moves all extremities, DTRs 2/4, gait normal, no tremor of the outstretched hand      Results for RHIANNON SILVA (MRN [id number]) as of 7/16/2018 16:39   Ref. Range 7/16/2018 10:15   T4 Free Latest Ref Range: 0.76 - 1.46 ng/dL 1.49 (H)   TSH Latest Ref Range: 0.40 - 4.00 mU/L 0.98     Examination: Nuclear medicine I123 whole body scan on   4/5/2018 11:08  AM.Clinical History:  ; Papillary thyroid carcinoma (H); Postsurgicalhypothyroidism; Abnormal radionuclide bone scan    Additional Information: none Indication: Thyroid cancer, evaluate whole body distribution, pre  therapy.Technique:The patient received 2.98 mci of  orally. At 24 hours whole body images were obtained as well as the SPECT CT images of the pelvis.  Findings:The whole body images demonstrates there is physiologic uptake in the  stomach, colon and urinary bladder. There is no tracer uptake associated with previously seen pelvic  lesions on 5/5/2017 dated bone which include sclerotic lesions in both  iliac bones and the sacrum.     Impression:   No abnormal tracer uptake associated with previously seen bony lesions  5/5/2017 dated bone scan to suggest metastatic disease. These are  unchanged since 4/20/2017 and could be treated metastasis vs  nonspecific bony lesions.    I have personally reviewed the examination and initial interpretation  and I agree with the findings.     JONA TREVINO MD        Again, thank you for allowing me to participate in the care of your patient.      Sincerely,    Katy Bah MD

## 2018-07-26 LAB — LAB SCANNED RESULT: ABNORMAL

## 2018-08-09 DIAGNOSIS — C73 PAPILLARY THYROID CARCINOMA (H): ICD-10-CM

## 2018-08-09 DIAGNOSIS — E89.0 POSTSURGICAL HYPOTHYROIDISM: ICD-10-CM

## 2018-08-09 DIAGNOSIS — M89.9 BONE LESION: ICD-10-CM

## 2018-08-09 RX ORDER — LEVOTHYROXINE SODIUM 150 MCG
TABLET ORAL
Qty: 90 TABLET | Refills: 3 | OUTPATIENT
Start: 2018-08-09

## 2018-10-15 DIAGNOSIS — E89.0 POSTSURGICAL HYPOTHYROIDISM: ICD-10-CM

## 2018-10-15 DIAGNOSIS — C73 PAPILLARY THYROID CARCINOMA (H): ICD-10-CM

## 2018-10-15 PROCEDURE — 84443 ASSAY THYROID STIM HORMONE: CPT

## 2018-10-15 PROCEDURE — 84439 ASSAY OF FREE THYROXINE: CPT

## 2018-10-15 PROCEDURE — 36415 COLL VENOUS BLD VENIPUNCTURE: CPT

## 2018-10-16 LAB
T4 FREE SERPL-MCNC: 1.4 NG/DL (ref 0.76–1.46)
TSH SERPL DL<=0.005 MIU/L-ACNC: 0.23 MU/L (ref 0.4–4)

## 2019-01-10 ENCOUNTER — APPOINTMENT (OUTPATIENT)
Dept: LAB | Facility: CLINIC | Age: 44
End: 2019-01-10
Payer: COMMERCIAL

## 2019-01-10 DIAGNOSIS — E89.0 POSTSURGICAL HYPOTHYROIDISM: ICD-10-CM

## 2019-01-10 DIAGNOSIS — C73 PAPILLARY THYROID CARCINOMA (H): Primary | ICD-10-CM

## 2019-01-22 ENCOUNTER — OFFICE VISIT (OUTPATIENT)
Dept: ENDOCRINOLOGY | Facility: CLINIC | Age: 44
End: 2019-01-22
Payer: COMMERCIAL

## 2019-01-22 VITALS
SYSTOLIC BLOOD PRESSURE: 117 MMHG | HEART RATE: 72 BPM | WEIGHT: 177.3 LBS | HEIGHT: 68 IN | BODY MASS INDEX: 26.87 KG/M2 | DIASTOLIC BLOOD PRESSURE: 72 MMHG

## 2019-01-22 DIAGNOSIS — E89.0 POSTSURGICAL HYPOTHYROIDISM: ICD-10-CM

## 2019-01-22 DIAGNOSIS — R76.8 THYROGLOBULIN ANTIBODY POSITIVE: ICD-10-CM

## 2019-01-22 DIAGNOSIS — M89.9 BONE LESION: ICD-10-CM

## 2019-01-22 DIAGNOSIS — C73 PAPILLARY THYROID CARCINOMA (H): ICD-10-CM

## 2019-01-22 DIAGNOSIS — C73 PAPILLARY THYROID CARCINOMA (H): Primary | ICD-10-CM

## 2019-01-22 LAB
T4 FREE SERPL-MCNC: 1.72 NG/DL (ref 0.76–1.46)
TSH SERPL DL<=0.005 MIU/L-ACNC: 0.3 MU/L (ref 0.4–4)

## 2019-01-22 ASSESSMENT — PAIN SCALES - GENERAL: PAINLEVEL: NO PAIN (0)

## 2019-01-22 ASSESSMENT — MIFFLIN-ST. JEOR: SCORE: 1500.11

## 2019-01-22 NOTE — LETTER
1/22/2019       RE: Joy Quispe  [address] [place] [address]     Dear Colleague,    Thank you for referring your patient, Joy Quispe, to the [facility] ENDOCRINOLOGY at [facility]. Please see a copy of my visit note below.    Endocrine Consult note    Attending Assessment/Plan :     1.  Papillary thyroid carcinoma, unifocal 2.5 cm, isthmus region, LN + including bulky notes up to 1.8 cm with extranodal extension  pT2, pN1, pMx, possible cM1 but this is very unclear .  MACIS3.95 best case, and 7.95 worst case if she has distant mets and incomplete resection  She has had 2 neck operations, 131I Rx x 1.    SIMON has been dropping rapidly, which is good.    RTC 6 months    2.  Post surgical hypothyroidism. Labs today. Treat to TSH < 0.4.    Unstable.  She is now on Synthroid 150 * 7.5/week, divided.  Labs today with dose adjustment.      3.  Abnormal  post therapy TBS performed at [facility]  - as per # 4.  Normal 123I TBS here 4/18.  Discussed again today.     4.  Abnormal bone CT, abnormal bone scan .  A Right sacral ala on bone scan  - sclerotic on CT coronal per my review of images  B) left acetabular on bone scan, sclerotic- - this area is likely too low to correspond to what was seen on 131I .    123I TBS 4/18 doesn't show uptake here, which goes against it being bone mets, but doesn't 100 % exclude it.  Discussed.  ? When to do next test.  We didn't arrive at decision - will await tumor marker data/     5.  Thyroglobulin antibody present  . As per # 1    Katy Bah MD      Cc/ HPI :  Joy presentsfor follow up of papillary thyroid carcinoma, post surgical hypothyroidism, abnormal bone imaging. I last saw her 7/18.      Her treatment course has been as follows:  2 operations for the thyroid cancer:   2/6/17 total thyroidectomy  (Dr Espinoza Taylor, [facility]) removing unifocal 2.5 cm Papillary thyroid carcinoma, isthmus with extension into right and left  lobes.  3/6 lymph nodes were + for metastatic thyroid cancer.  Background lymphocytic thyroiditis.   4/13/17: 2 dose Thyrogen TBS followed by 131I 101 mCi on 4/13/17 ([facility] with abnormal post therapy TBS  raising questions about the left lower quadrant, including left pelvis or GI.  11/8/17: reoperative CND level 6 and 7 (Evasovich)- 5 nodes + for PCT - largest 1.8 cm, extranodal extension present, focal  4/5/18 2.98 mCi 123I TBS: negative      We have the following tumor marker data:   2/16/17 Surgery  4/13/17: Tg 0.1, SIMON 210.3  4/13/17: 101 mci 131I  4/21/17: Tg 4.2, SIMON 115, TSH 5.97  8/23/17 : Tg 3.7, SIMON 115 -  11/17 Operation # 2  1/15/18: TSH 0.36  1/30/18 Tg 3.1, SIMON 17, TSH not measured  4/6/18 Tg 3, SIMON 9.4, TSH 23.8 day 5 thyrogen    7/6/18: Tg 1.9, SIMON 7.1, TSH 0.23     images on PACS   4/13/17: 123I 6.6 mCi total body scan (Thyrogen stimulated; [facility]) moderate uptake thyroid bed.   4/13/17: 101 mCi 131I  4/21/17 post treatment TBS -raising questions about the left lower quadrant, including left pelvis or GI.  4/21/17: post therapy scan ; neck uptake.  I wouldn't have called - tiny focus left abdomen lateral  And also 2 foci midline pelvis- one I would attribute to bladder and the other ?   4/28/17 CT pelvis -sclerotic bone lesions as described by radiologist.do not correspond to likely location of 131I uptake seen on outside TBS . Radiologist states some definitely represent bone islands but some are indeterminant .  5/5/17 bone scan: 2 areas noted by radiologist s- anterior column left acetabulum and right sacral body.    3/6/18 neck US :   Left thyroid bed level 6 # 1 0.5 x 0.3 x 0.7 cm hypoechoic mass  Left level 6 # 2 0.5 x 0.2 x 0.6 cm  Left level 2-4 cine has some small LNs with Echogenic hilum  4/5/18 2.98 mCi 123I TBS (thyrogen stimulated):       REVIEW OF SYSTEMS  Weight had been up to 182 - down 5 lbs  Cardiac: negative  Respiratory: negative  GI: negative  No periods with  "IUD  Wants to be told she is cancer free  Tired of talking about cqancer  No pain    Past Medical History  Past Medical History:   Diagnosis Date     Papillary thyroid carcinoma (H) 02/06/2017     PONV (postoperative nausea and vomiting)      Post-surgical hypothyroidism 02/06/2017     Past Surgical History:   Procedure Laterality Date     EXPLORE NECK N/A 11/8/2017    Procedure: EXPLORE NECK;  Re-Operative Central Neck Dissection ;  Surgeon: Yanet Cordova MD;  Location: [facility] OR     total thyroidectomy  02/06/2017    [facility], Dr Espinoza Taylor       Medications    Current Outpatient Medications   Medication Sig Dispense Refill     acetaminophen (TYLENOL) 325 MG tablet Take 2 tablets (650 mg) by mouth every 4 hours as needed for other (mild pain) 100 tablet 0     cholecalciferol (VITAMIN D3) 5000 UNITS CAPS capsule Take by mouth daily       levonorgestrel (MIRENA) 20 MCG/24HR IUD        SYNTHROID 150 MCG tablet MON to SAT 1 tablet/day;SUN 1.5 tablet 96 tablet 3       Social History  Social History     Tobacco Use     Smoking status: Never Smoker     Smokeless tobacco: Never Used   Substance Use Topics     Alcohol use: No     Drug use: No     ;     EXAM  GENERAL: quiet woman in NAD  /72   Pulse 72   Ht 1.715 m (5' 7.52\")   Wt 80.4 kg (177 lb 4.8 oz)   BMI 27.34 kg/m     SKIN: normal color, temperature, texture   HEENT: PER, no scleral icterus, eyelid retraction, stare, lid lag, proptosis or conjunctival injection.    NECK: supple.  No visible or palpable masses.   LUNGS: clear to auscultation bilaterally.   CARDIAC: RRR, S1, S2 without murmurs, rubs or gallops.     NEURO: Alert, responds appropriately to questions,moves all extremities, DTRs 2/4, no tremor of the outstretched hand      Results for RHIANNON SILVA (MRN [id number]) as of 1/22/2019 15:12   Ref. Range 1/22/2019 13:42   T4 Free Latest Ref Range: 0.76 - 1.46 ng/dL 1.72 (H)   TSH Latest Ref Range: 0.40 - 4.00 mU/L 0.30 (L) "       ENDO [facility]YROID LABS[facility] Latest Ref Rng & Units 10/15/2018 7/16/2018   TSH 0.40 - 4.00 mU/L 0.23 (L) 0.98   T4 FREE 0.76 - 1.46 ng/dL 1.40 1.49 (H)     ENDO THYROID [facility] Latest Ref Rng & Units 4/6/2018 1/15/2018   TSH 0.40 - 4.00 mU/L 23.80 (H) 0.36 (L)   T4 FREE 0.76 - 1.46 ng/dL  1.69 (H)       Again, thank you for allowing me to participate in the care of your patient.      Sincerely,    Katy Bah MD

## 2019-01-22 NOTE — PROGRESS NOTES
Endocrine Consult note    Attending Assessment/Plan :     1.  Papillary thyroid carcinoma, unifocal 2.5 cm, isthmus region, LN + including bulky notes up to 1.8 cm with extranodal extension  pT2, pN1, pMx, possible cM1 but this is very unclear .  MACIS3.95 best case, and 7.95 worst case if she has distant mets and incomplete resection  She has had 2 neck operations, 131I Rx x 1.    SIMON has been dropping rapidly, which is good.    RTC 6 months    2.  Post surgical hypothyroidism. Labs today. Treat to TSH < 0.4.    Unstable.  She is now on Synthroid 150 * 7.5/week, divided.  Labs today with dose adjustment.      3.  Abnormal  post therapy TBS performed at [facility]  - as per # 4.  Normal 123I TBS here 4/18.  Discussed again today.     4.  Abnormal bone CT, abnormal bone scan .  A Right sacral ala on bone scan  - sclerotic on CT coronal per my review of images  B) left acetabular on bone scan, sclerotic- - this area is likely too low to correspond to what was seen on 131I .    123I TBS 4/18 doesn't show uptake here, which goes against it being bone mets, but doesn't 100 % exclude it.  Discussed.  ? When to do next test.  We didn't arrive at decision - will await tumor marker data/     5.  Thyroglobulin antibody present  . As per # 1    Katy Bah MD      Cc/ HPI :  Joy presentsfor follow up of papillary thyroid carcinoma, post surgical hypothyroidism, abnormal bone imaging. I last saw her 7/18.      Her treatment course has been as follows:  2 operations for the thyroid cancer:   2/6/17 total thyroidectomy  (Dr Espinoza Taylor, [facility]) removing unifocal 2.5 cm Papillary thyroid carcinoma, isthmus with extension into right and left lobes.  3/6 lymph nodes were + for metastatic thyroid cancer.  Background lymphocytic thyroiditis.   4/13/17: 2 dose Thyrogen TBS followed by 131I 101 mCi on 4/13/17 ([facility] with abnormal post therapy TBS  raising questions about the left lower quadrant, including left pelvis or  GI.  11/8/17: reoperative CND level 6 and 7 ([facility])- 5 nodes + for PCT - largest 1.8 cm, extranodal extension present, focal  4/5/18 2.98 mCi 123I TBS: negative      We have the following tumor marker data:   2/16/17 Surgery  4/13/17: Tg 0.1, SIMON 210.3  4/13/17: 101 mci 131I  4/21/17: Tg 4.2, SIMON 115, TSH 5.97  8/23/17 : Tg 3.7, SIMON 115 -  11/17 Operation # 2  1/15/18: TSH 0.36  1/30/18 Tg 3.1, SIMON 17, TSH not measured  4/6/18 Tg 3, SIMON 9.4, TSH 23.8 day 5 thyrogen    7/6/18: Tg 1.9, SIMON 7.1, TSH 0.23  1/22/19: Tg 1.5, SIMON 6.5, TSH 0.3, free T4 1.72 - results followed appt, not discussed at appt.      images on PACS   4/13/17: 123I 6.6 mCi total body scan (Thyrogen stimulated; [facility]) moderate uptake thyroid bed.   4/13/17: 101 mCi 131I  4/21/17 post treatment TBS -raising questions about the left lower quadrant, including left pelvis or GI.  4/21/17: post therapy scan ; neck uptake.  I wouldn't have called - tiny focus left abdomen lateral  And also 2 foci midline pelvis- one I would attribute to bladder and the other ?   4/28/17 CT pelvis -sclerotic bone lesions as described by radiologist.do not correspond to likely location of 131I uptake seen on outside TBS . Radiologist states some definitely represent bone islands but some are indeterminant .  5/5/17 bone scan: 2 areas noted by radiologist s- anterior column left acetabulum and right sacral body.    3/6/18 neck US :   Left thyroid bed level 6 # 1 0.5 x 0.3 x 0.7 cm hypoechoic mass  Left level 6 # 2 0.5 x 0.2 x 0.6 cm  Left level 2-4 cine has some small LNs with Echogenic hilum  4/5/18 2.98 mCi 123I TBS (thyrogen stimulated):       REVIEW OF SYSTEMS  Weight had been up to 182 - down 5 lbs  Cardiac: negative  Respiratory: negative  GI: negative  No periods with IUD  Wants to be told she is cancer free  Tired of talking about cqancer  No pain    Past Medical History  Past Medical History:   Diagnosis Date     Papillary thyroid carcinoma (H)  "02/06/2017     PONV (postoperative nausea and vomiting)      Post-surgical hypothyroidism 02/06/2017     Past Surgical History:   Procedure Laterality Date     EXPLORE NECK N/A 11/8/2017    Procedure: EXPLORE NECK;  Re-Operative Central Neck Dissection ;  Surgeon: Yanet Cordova MD;  Location: [facility] OR     total thyroidectomy  02/06/2017    [facility], Dr Espinoza Taylor       Medications    Current Outpatient Medications   Medication Sig Dispense Refill     acetaminophen (TYLENOL) 325 MG tablet Take 2 tablets (650 mg) by mouth every 4 hours as needed for other (mild pain) 100 tablet 0     cholecalciferol (VITAMIN D3) 5000 UNITS CAPS capsule Take by mouth daily       levonorgestrel (MIRENA) 20 MCG/24HR IUD        SYNTHROID 150 MCG tablet MON to SAT 1 tablet/day;SUN 1.5 tablet 96 tablet 3       Social History  Social History     Tobacco Use     Smoking status: Never Smoker     Smokeless tobacco: Never Used   Substance Use Topics     Alcohol use: No     Drug use: No     ;     EXAM  GENERAL: quiet woman in NAD  /72   Pulse 72   Ht 1.715 m (5' 7.52\")   Wt 80.4 kg (177 lb 4.8 oz)   BMI 27.34 kg/m    SKIN: normal color, temperature, texture   HEENT: PER, no scleral icterus, eyelid retraction, stare, lid lag, proptosis or conjunctival injection.    NECK: supple.  No visible or palpable masses.   LUNGS: clear to auscultation bilaterally.   CARDIAC: RRR, S1, S2 without murmurs, rubs or gallops.     NEURO: Alert, responds appropriately to questions,moves all extremities, DTRs 2/4, no tremor of the outstretched hand      Results for RHIANNON SILVA (MRN [id number]) as of 1/22/2019 15:12   Ref. Range 1/22/2019 13:42   T4 Free Latest Ref Range: 0.76 - 1.46 ng/dL 1.72 (H)   TSH Latest Ref Range: 0.40 - 4.00 mU/L 0.30 (L)       ENDO THYROID LABS-[facility] Latest Ref Rng & Units 10/15/2018 7/16/2018   TSH 0.40 - 4.00 mU/L 0.23 (L) 0.98   T4 FREE 0.76 - 1.46 ng/dL 1.40 1.49 (H)     ENDO THYROID LABS[facility] Latest Ref Rng " & Units 4/6/2018 1/15/2018   TSH 0.40 - 4.00 mU/L 23.80 (H) 0.36 (L)   T4 FREE 0.76 - 1.46 ng/dL  1.69 (H)

## 2019-02-01 LAB — LAB SCANNED RESULT: ABNORMAL

## 2019-02-12 DIAGNOSIS — E89.0 POSTSURGICAL HYPOTHYROIDISM: ICD-10-CM

## 2019-02-12 DIAGNOSIS — M89.9 BONE LESION: ICD-10-CM

## 2019-02-12 DIAGNOSIS — C73 PAPILLARY THYROID CARCINOMA (H): ICD-10-CM

## 2019-02-12 RX ORDER — LEVOTHYROXINE SODIUM 150 MCG
TABLET ORAL
Qty: 96 TABLET | Refills: 3 | Status: SHIPPED | OUTPATIENT
Start: 2019-02-12 | End: 2020-01-25

## 2019-07-22 ENCOUNTER — OFFICE VISIT (OUTPATIENT)
Dept: ENDOCRINOLOGY | Facility: CLINIC | Age: 44
End: 2019-07-22
Payer: COMMERCIAL

## 2019-07-22 VITALS
HEART RATE: 56 BPM | WEIGHT: 174.9 LBS | HEIGHT: 68 IN | DIASTOLIC BLOOD PRESSURE: 72 MMHG | BODY MASS INDEX: 26.51 KG/M2 | SYSTOLIC BLOOD PRESSURE: 116 MMHG

## 2019-07-22 DIAGNOSIS — E89.0 POSTSURGICAL HYPOTHYROIDISM: ICD-10-CM

## 2019-07-22 DIAGNOSIS — C73 PAPILLARY THYROID CARCINOMA (H): ICD-10-CM

## 2019-07-22 DIAGNOSIS — C73 PAPILLARY THYROID CARCINOMA (H): Primary | ICD-10-CM

## 2019-07-22 DIAGNOSIS — R76.8 THYROGLOBULIN ANTIBODY POSITIVE: ICD-10-CM

## 2019-07-22 LAB
T4 FREE SERPL-MCNC: 1.53 NG/DL (ref 0.76–1.46)
TSH SERPL DL<=0.005 MIU/L-ACNC: 0.16 MU/L (ref 0.4–4)

## 2019-07-22 ASSESSMENT — MIFFLIN-ST. JEOR: SCORE: 1483.9

## 2019-07-22 ASSESSMENT — PAIN SCALES - GENERAL: PAINLEVEL: NO PAIN (0)

## 2019-07-22 NOTE — LETTER
7/22/2019       RE: Joy Quispe  [address] [place] [address]     Dear Colleague,    Thank you for referring your patient, Joy Quispe, to the [facility] ENDOCRINOLOGY at [facility]. Please see a copy of my visit note below.    Endocrine Consult note    Attending Assessment/Plan :     1.  Papillary thyroid carcinoma, unifocal 2.5 cm, isthmus region, LN + including bulky notes up to 1.8 cm with extranodal extension  pT2, pN1, pMx, possible cM1 but this is very unclear .  MACIS3.95 best case, and 7.95 worst case if she has distant mets and incomplete resection  She has had 2 neck operations, 131I Rx x 1.    SIMON has been dropping rapidly, which is good.  Labs, neck US  RTC 6 months    2.  Post surgical hypothyroidism. Labs today. Treat to TSH < 0.4.    Unstable.  She is now on Synthroid 150 * 7.5/week, divided.  Labs today with dose adjustment.      3.  Abnormal  post therapy TBS performed at [facility]  - as per # 4.  Normal 123I TBS here 4/18.       4.  Abnormal bone CT, abnormal bone scan .  A Right sacral ala on bone scan  - sclerotic on CT coronal per my review of images  B) left acetabular on bone scan, sclerotic- - this area is likely too low to correspond to what was seen on 131I .    123I TBS 4/18 doesn't show uptake here, which goes against it being bone mets, but doesn't 100 % exclude it.  ? When to do next test.     5.  Thyroglobulin antibody present  . As per # 1    Katy Bah MD      Cc/ HPI :  Joy presents for follow up of papillary thyroid carcinoma, post surgical hypothyroidism, abnormal bone imaging. I last saw her 1/19    Her treatment course has been as follows:  2 operations for the thyroid cancer:   2/6/17 total thyroidectomy  (Dr Espinoza Taylor, [facility]) removing unifocal 2.5 cm Papillary thyroid carcinoma, isthmus with extension into right and left lobes.  3/6 lymph nodes were + for metastatic thyroid cancer.  Background lymphocytic  thyroiditis.   4/13/17: 2 dose Thyrogen TBS followed by 131I 101 mCi on 4/13/17 ([facility] with abnormal post therapy TBS  raising questions about the left lower quadrant, including left pelvis or GI.  11/8/17: reoperative CND level 6 and 7 ([facility])- 5 nodes + for PCT - largest 1.8 cm, extranodal extension present, focal  4/5/18 2.98 mCi 123I TBS: negative    We have the following tumor marker data:   2/16/17 Surgery  4/13/17: Tg 0.1, SIMON 210.3  4/13/17: 101 mci 131I  4/21/17: Tg 4.2, SIMON 115, TSH 5.97  8/23/17 : Tg 3.7, SIMON 115 -  11/17 Operation # 2  1/15/18: TSH 0.36  1/30/18 Tg 3.1, SIMON 17, TSH not measured  4/6/18 Tg 3, SIMON 9.4, TSH 23.8 day 5 thyrogen    7/6/18: Tg 1.9, SIMON 7.1, TSH 0.23  1/22/19: Tg 1.5, SIMON 6.5, TSH 0.3, free T4 1.72 -   7/22/19 TSH 0.16, free T4 1.53, Tg pending.     images on PACS   4/13/17: 123I 6.6 mCi total body scan (Thyrogen stimulated; [facility]) moderate uptake thyroid bed.   4/13/17: 101 mCi 131I  4/21/17 post treatment TBS -raising questions about the left lower quadrant, including left pelvis or GI.  4/21/17: post therapy scan ; neck uptake.  I wouldn't have called - tiny focus left abdomen lateral  And also 2 foci midline pelvis- one I would attribute to bladder and the other ?   4/28/17 CT pelvis -sclerotic bone lesions as described by radiologist.do not correspond to likely location of 131I uptake seen on outside TBS . Radiologist states some definitely represent bone islands but some are indeterminant .  5/5/17 bone scan: 2 areas noted by radiologist s- anterior column left acetabulum and right sacral body.    3/6/18 neck US :   Left thyroid bed level 6 # 1 0.5 x 0.3 x 0.7 cm hypoechoic mass  Left level 6 # 2 0.5 x 0.2 x 0.6 cm  Left level 2-4 cine has some small LNs with Echogenic hilum  4/5/18 2.98 mCi 123I TBS (thyrogen stimulated):       REVIEW OF SYSTEMS  Feels normal  Energy OK  No pains  Cardiac: negative  Respiratory: negative  GI: negative    Past Medical  "History  Past Medical History:   Diagnosis Date     Papillary thyroid carcinoma (H) 02/06/2017     PONV (postoperative nausea and vomiting)      Post-surgical hypothyroidism 02/06/2017     Past Surgical History:   Procedure Laterality Date     EXPLORE NECK N/A 11/8/2017    Procedure: EXPLORE NECK;  Re-Operative Central Neck Dissection ;  Surgeon: Yanet Cordova MD;  Location: [facility] OR     total thyroidectomy  02/06/2017    [facility], Dr Espinoza Taylor       Medications    Current Outpatient Medications   Medication Sig Dispense Refill     acetaminophen (TYLENOL) 325 MG tablet Take 2 tablets (650 mg) by mouth every 4 hours as needed for other (mild pain) 100 tablet 0     cholecalciferol (VITAMIN D3) 5000 UNITS CAPS capsule Take by mouth daily       levonorgestrel (MIRENA) 20 MCG/24HR IUD        SYNTHROID 150 MCG tablet MON to SAT 1 tablet/day;SUN 1.5 tablet 96 tablet 3       Social History  Social History     Tobacco Use     Smoking status: Never Smoker     Smokeless tobacco: Never Used   Substance Use Topics     Alcohol use: No     Drug use: No     ; 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    NECK: supple.  No visible or palpable masses.   LUNGS: clear to auscultation bilaterally.   CARDIAC: RRR, S1, S2 without murmurs, rubs or gallops.     NEURO: Alert, responds appropriately to questions,moves all extremities, DTRs 2/4, no tremor of the outstretched hand    ENDO THYROID LABS[facility] Latest Ref Rng & Units 7/22/2019 1/22/2019   TSH 0.40 - 4.00 mU/L 0.16 (L) 0.30 (L)   T4 FREE 0.76 - 1.46 ng/dL 1.53 (H) 1.72 (H)     ENDO THYROID LABS[facility] Latest Ref Rng & Units 10/15/2018   TSH 0.40 - 4.00 mU/L 0.23 (L)   T4 " FREE 0.76 - 1.46 ng/dL 1.40       Again, thank you for allowing me to participate in the care of your patient.      Sincerely,    Katy Bah MD

## 2019-07-22 NOTE — PROGRESS NOTES
Endocrine Consult note    Attending Assessment/Plan :     1.  Papillary thyroid carcinoma, unifocal 2.5 cm, isthmus region, LN + including bulky notes up to 1.8 cm with extranodal extension  pT2, pN1, pMx, possible cM1 but this is very unclear .  MACIS3.95 best case, and 7.95 worst case if she has distant mets and incomplete resection  She has had 2 neck operations, 131I Rx x 1.    SIOMN has been dropping rapidly, which is good.  Labs, neck US  RTC 6 months    Addendum: review of neck US 7/29/19 compared with 3/18:   Left level 3 # 1 0.8 x 0.5 x 1.5 cm Echogenic hilum   Left level 5 # 1 1 x 0.5 x 0.9 cm   Left level 5 # 2 low 0.8 x 0.5 x 1.2 cm   Left level 6 # 1 0.4 x 0.4 x 0.8 cm  (was 0.5 x 0.3 x 0.7 cm )  Left level 6 # 2 0.4 x 0.3 x 0.7 cm (was 0.5 x 0.2 x 0.6 cm )  Left level 6 # 3  0.4 x 0.3 x 0.5 cm - not seen       2.  Post surgical hypothyroidism. Labs today. Treat to TSH < 0.4.    Unstable.  She is now on Synthroid 150 * 7.5/week, divided.  Labs today with dose adjustment.      3.  Abnormal  post therapy TBS performed at [facility]  - as per # 4.  Normal 123I TBS here 4/18.       4.  Abnormal bone CT, abnormal bone scan .  A Right sacral ala on bone scan  - sclerotic on CT coronal per my review of images  B) left acetabular on bone scan, sclerotic- - this area is likely too low to correspond to what was seen on 131I .    123I TBS 4/18 doesn't show uptake here, which goes against it being bone mets, but doesn't 100 % exclude it.  ? When to do next test.     5.  Thyroglobulin antibody present  . As per # 1    Katy Bah MD      Cc/ HPI :  Joy presents for follow up of papillary thyroid carcinoma, post surgical hypothyroidism, abnormal bone imaging. I last saw her 1/19    Her treatment course has been as follows:  2 operations for the thyroid cancer:   2/6/17 total thyroidectomy  (Dr Espinoza Taylor, [facility]) removing unifocal 2.5 cm Papillary thyroid carcinoma, isthmus with extension into right and  left lobes.  3/6 lymph nodes were + for metastatic thyroid cancer.  Background lymphocytic thyroiditis.   4/13/17: 2 dose Thyrogen TBS followed by 131I 101 mCi on 4/13/17 ([facility] with abnormal post therapy TBS  raising questions about the left lower quadrant, including left pelvis or GI.  [facility] + for PCT - largest 1.8 cm, extranodal extension present, focal  4/5/18 2.98 mCi 123I TBS: negative    We have the following tumor marker data:   2/16/17 Surgery  4/13/17: Tg 0.1, SIMON 210.3  4/13/17: 101 mci 131I  4/21/17: Tg 4.2, SIMON 115, TSH 5.97  8/23/17 : Tg 3.7, SIMON 115 -  11/17 Operation # 2  1/15/18: TSH 0.36  1/30/18 Tg 3.1, SIMON 17, TSH not measured  4/6/18 Tg 3, SIMON 9.4, TSH 23.8 day 5 thyrogen    7/6/18: Tg 1.9, SIMON 7.1, TSH 0.23  1/22/19: Tg 1.5, SIMON 6.5, TSH 0.3, free T4 1.72 -   7/22/19 TSH 0.16, free T4 1.53, Tg pending.; addendum: Tg 1.8, SIMON 4.7     images on PACS   4/13/17: 123I 6.6 mCi total body scan (Thyrogen stimulated; [facility]) moderate uptake thyroid bed.   4/13/17: 101 mCi 131I  4/21/17 post treatment TBS -raising questions about the left lower quadrant, including left pelvis or GI.  4/21/17: post therapy scan ; neck uptake.  I wouldn't have called - tiny focus left abdomen lateral  And also 2 foci midline pelvis- one I would attribute to bladder and the other ?   4/28/17 CT pelvis -sclerotic bone lesions as described by radiologist.do not correspond to likely location of 131I uptake seen on outside TBS . Radiologist states some definitely represent bone islands but some are indeterminant .  5/5/17 bone scan: 2 areas noted by radiologist s- anterior column left acetabulum and right sacral body.    3/6/18 neck US :   Left thyroid bed level 6 # 1 0.5 x 0.3 x 0.7 cm hypoechoic mass  Left level 6 # 2 0.5 x 0.2 x 0.6 cm  Left level 2-4 cine has some small LNs with Echogenic hilum  4/5/18 2.98 mCi 123I TBS (thyrogen stimulated):       REVIEW OF  "SYSTEMS  Feels normal  Energy OK  No pains  Cardiac: negative  Respiratory: negative  GI: negative    Past Medical History  Past Medical History:   Diagnosis Date     Papillary thyroid carcinoma (H) 02/06/2017     PONV (postoperative nausea and vomiting)      Post-surgical hypothyroidism 02/06/2017     Past Surgical History:   Procedure Laterality Date     EXPLORE NECK N/A 11/8/2017    Procedure: EXPLORE NECK;  Re-Operative Central Neck Dissection ;  Surgeon: Yanet Cordova MD;  Location: [facility] OR     total thyroidectomy  02/06/2017    [facility], Dr Espinoza Taylor       Medications    Current Outpatient Medications   Medication Sig Dispense Refill     acetaminophen (TYLENOL) 325 MG tablet Take 2 tablets (650 mg) by mouth every 4 hours as needed for other (mild pain) 100 tablet 0     cholecalciferol (VITAMIN D3) 5000 UNITS CAPS capsule Take by mouth daily       levonorgestrel (MIRENA) 20 MCG/24HR IUD        SYNTHROID 150 MCG tablet MON to SAT 1 tablet/day;SUN 1.5 tablet 96 tablet 3       Social History  Social History     Tobacco Use     Smoking status: Never Smoker     Smokeless tobacco: Never Used   Substance Use Topics     Alcohol use: No     Drug use: No     ; 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    NECK: supple.  No visible or palpable masses.   LUNGS: clear to auscultation bilaterally.   CARDIAC: RRR, S1, S2 without murmurs, rubs or gallops.     NEURO: Alert, responds appropriately to questions,moves all extremities, DTRs 2/4, no tremor of the outstretched hand    ENDO THYROID LABS[facility] Latest Ref Rng & Units 7/22/2019 1/22/2019   TSH 0.40 - 4.00 mU/L 0.16 (L) 0.30 (L)   T4 FREE 0.76 - 1.46 ng/dL " 1.53 (H) 1.72 (H)     ENDO THYROID LABS[facility] Latest Ref Rng & Units 10/15/2018   TSH 0.40 - 4.00 mU/L 0.23 (L)   T4 FREE 0.76 - 1.46 ng/dL 1.40     EXAM: Ultrasound soft tissue neck on 7/29/2019 1:25 PM     COMPARISON: 3/6/2018     HISTORY: Papillary thyroid carcinoma, postsurgical hypothyroidism.     FINDINGS: There is no residual thyroid tissue in the thyroidectomy  bed.     Lymph nodes are measured bilaterally with measurements given in  craniocaudal, transverse and AP dimensions as follows:     Right:     Level 2: There is a new 1.2 x 0.7 x 1.4 cm benign-appearing lymph node  with normal fatty hilum.  Level 3: No suspicious node.  Level 4: No suspicious node.  Level 5: No suspicious node.  Level 6: No suspicious node.  Level 7: No suspicious node.     Left:     Level 2: Benign-appearing 1.2 x 0.7 x 1.6 cm lymph node with intact  fatty hilum.  Level 3: Benign-appearing 0.8 x 0.5 x 1.5 cm  lymph node with the  hilum.  Level 4: No suspicious node.  Level 5:   -There is a 1.0 x 0.5 x 0.9 cm indistinct fatty hilum and cortex,  previously 0.5 x 0.2 x 0.7 cm.  -There is a 0.8 x 0.5 x 1.2 cm lymph node with intact fatty hilum,  previously 0.7 0.2 x 0.6 cm.  Level 6: There are 3, benign-appearing lymph nodes measuring 0.4 x 0.4  x 0.8 cm, 0.4 x 0.3 x 0.7 cm, and 0.4 x 0.3 x 0.5 cm.  Level 7: No suspicious node                                                                      IMPRESSION:  1. Soft tissue neck ultrasound with lymph node measurements as  described above. No interval development of suspicious  lymphadenopathy.  2. Level 5 lymph node with indistinct fatty hilum as above, attention  on follow-up.  3. Benign appearing lymph nodes in the left level 6.     I have personally reviewed the examination and initial interpretation  and I agree with the findings.     JOHN NUNEZ MD

## 2019-07-29 ENCOUNTER — ANCILLARY PROCEDURE (OUTPATIENT)
Dept: ULTRASOUND IMAGING | Facility: CLINIC | Age: 44
End: 2019-07-29
Payer: COMMERCIAL

## 2019-07-29 DIAGNOSIS — E89.0 POSTSURGICAL HYPOTHYROIDISM: ICD-10-CM

## 2019-07-29 DIAGNOSIS — C73 PAPILLARY THYROID CARCINOMA (H): ICD-10-CM

## 2019-08-02 LAB — LAB SCANNED RESULT: ABNORMAL

## 2019-11-06 ENCOUNTER — HEALTH MAINTENANCE LETTER (OUTPATIENT)
Age: 44
End: 2019-11-06

## 2019-12-30 ENCOUNTER — DOCUMENTATION ONLY (OUTPATIENT)
Dept: CARE COORDINATION | Facility: CLINIC | Age: 44
End: 2019-12-30

## 2020-01-22 ENCOUNTER — OFFICE VISIT (OUTPATIENT)
Dept: ENDOCRINOLOGY | Facility: CLINIC | Age: 45
End: 2020-01-22
Payer: COMMERCIAL

## 2020-01-22 VITALS
DIASTOLIC BLOOD PRESSURE: 85 MMHG | HEART RATE: 93 BPM | WEIGHT: 179.5 LBS | SYSTOLIC BLOOD PRESSURE: 127 MMHG | BODY MASS INDEX: 27.7 KG/M2

## 2020-01-22 DIAGNOSIS — C73 PAPILLARY THYROID CARCINOMA (H): ICD-10-CM

## 2020-01-22 DIAGNOSIS — E89.0 POSTSURGICAL HYPOTHYROIDISM: ICD-10-CM

## 2020-01-22 DIAGNOSIS — R94.8 ABNORMAL RADIONUCLIDE BONE SCAN: ICD-10-CM

## 2020-01-22 DIAGNOSIS — C73 PAPILLARY THYROID CARCINOMA (H): Primary | ICD-10-CM

## 2020-01-22 LAB
T4 FREE SERPL-MCNC: 1.71 NG/DL (ref 0.76–1.46)
TSH SERPL DL<=0.005 MIU/L-ACNC: 0.1 MU/L (ref 0.4–4)

## 2020-01-22 ASSESSMENT — PAIN SCALES - GENERAL: PAINLEVEL: NO PAIN (0)

## 2020-01-22 NOTE — LETTER
1/22/2020       RE: Joy Quispe  [address] [place] [address]     Dear Colleague,    Thank you for referring your patient, Joy Quispe, to the [facility] ENDOCRINOLOGY at [facility]. Please see a copy of my visit note below.    Endocrine Consult note    Attending Assessment/Plan :     1.  Papillary thyroid carcinoma, unifocal 2.5 cm, isthmus region, LN + including bulky notes up to 1.8 cm with extranodal extension  pT2, pN1, pMx, possible cM1 but this is very unclear .  MACIS3.95 best case, and 7.95 worst case if she has distant mets and incomplete resection  She has had 2 neck operations, 131I Rx x 1.    SIMON has been dropping rapidly, which is good.  Labs, neck US  RTC 6 months    2.  Post surgical hypothyroidism. Labs today. Treat to TSH < 0.4.    Unstable.  She is now on Synthroid 150 * 7.5/week, divided.  Labs today with dose adjustment.      3.  Abnormal  post therapy TBS performed at [facility]  - as per # 4.  Normal 123I TBS here 4/18.       4.  Abnormal bone CT, abnormal bone scan .  A Right sacral ala on bone scan  - sclerotic on CT coronal per my review of images  B) left acetabular on bone scan, sclerotic- - this area is likely too low to correspond to what was seen on 131I .    123I TBS 4/18 doesn't show uptake here, which goes against it being bone mets, but doesn't 100 % exclude it.  Repeat CT pelvis bone without contrast.     5.  Thyroglobulin antibody present  . As per # 1    Katy Bah MD      Cc/ HPI :  Joy presents for follow up of papillary thyroid carcinoma, post surgical hypothyroidism, abnormal bone imaging. I last saw her 7/19    Her treatment course has been as follows:  2 operations for the thyroid cancer:   2/6/17 total thyroidectomy  (Dr Espinoza Taylor, [facility])  unifocal 2.5 cm Papillary thyroid carcinoma, isthmus with extension into right and left lobes.  3/6 lymph nodes were + for metastatic thyroid cancer.  Background  lymphocytic thyroiditis.   4/13/17: 2 dose Thyrogen TBS followed by 131I 101 mCi on 4/13/17 ([facility] with abnormal post therapy TBS  raising questions about the left lower quadrant, including left pelvis or GI.  11/8/17: reoperative CND level 6 and 7 ([facility])- 5 nodes + for PCT - largest 1.8 cm, extranodal extension present, focal  4/5/18 2.98 mCi 123I TBS: negative    We have the following tumor marker data:   2/16/17 Surgery  4/13/17: Tg 0.1, SIMON 210.3  4/13/17: 101 mci 131I  4/21/17: Tg 4.2, SIMON 115, TSH 5.97  8/23/17 : Tg 3.7, SIMON 115 -  11/17 Operation # 2  1/15/18: TSH 0.36  1/30/18 Tg 3.1, SIMON 17, TSH not measured  4/6/18 Tg 3, SIMON 9.4, TSH 23.8 day 5 thyrogen    7/6/18: Tg 1.9, SIMON 7.1, TSH 0.23  1/22/19: Tg 1.5, SIMON 6.5, TSH 0.3, free T4 1.72 -   7/22/19 Tg 1.8, SIMON 4.7, TSH 0.16, free T4 1.53,  Tg 1.8, SIMON 4.7     images on PACS   4/13/17: 123I 6.6 mCi total body scan (Thyrogen stimulated; [facility]) moderate uptake thyroid bed.   4/13/17: 101 mCi 131I  4/21/17 post treatment TBS -raising questions about the left lower quadrant, including left pelvis or GI.  4/21/17: post therapy scan ; neck uptake.  I wouldn't have called - tiny focus left abdomen lateral  And also 2 foci midline pelvis- one I would attribute to bladder and the other ?   4/28/17 CT pelvis -sclerotic bone lesions as described by radiologist.do not correspond to likely location of 131I uptake seen on outside TBS . Radiologist states some definitely represent bone islands but some are indeterminant .  5/5/17 bone scan: 2 areas noted by radiologist s- anterior column left acetabulum and right sacral body.    3/6/18 neck US :   Left thyroid bed level 6 # 1 0.5 x 0.3 x 0.7 cm hypoechoic mass  Left level 6 # 2 0.5 x 0.2 x 0.6 cm  Left level 2-4 cine has some small LNs with Echogenic hilum  4/5/18 2.98 mCi 123I TBS (thyrogen stimulated):   neck US 7/29/19 compared with 3/18:   Left level 3 # 1 0.8 x 0.5 x 1.5 cm Echogenic hilum   Left  level 5 # 1 1 x 0.5 x 0.9 cm   Left level 5 # 2 low 0.8 x 0.5 x 1.2 cm   Left level 6 # 1 0.4 x 0.4 x 0.8 cm  (was 0.5 x 0.3 x 0.7 cm )  Left level 6 # 2 0.4 x 0.3 x 0.7 cm (was 0.5 x 0.2 x 0.6 cm )  Left level 6 # 3  0.4 x 0.3 x 0.5 cm - not seen       REVIEW OF SYSTEMS  Sick forever-- in bed for weeks.  Fever last week; low grade over the weekend to 99  Coughing at night and during the day; trying to be productive.   Was doing intermittent fasting 16/8 - last meal 6-7 PM --   Weight loss   Cardiac: negative  Respiratory: negative  GI: negative   Back pain    Past Medical History  Past Medical History:   Diagnosis Date     Papillary thyroid carcinoma (H) 02/06/2017     PONV (postoperative nausea and vomiting)      Post-surgical hypothyroidism 02/06/2017     Past Surgical History:   Procedure Laterality Date     EXPLORE NECK N/A 11/8/2017    Procedure: EXPLORE NECK;  Re-Operative Central Neck Dissection ;  Surgeon: Yanet Cordova MD;  Location: [facility] OR     total thyroidectomy  02/06/2017    [facility], Dr Espinoza Taylor       Medications    Current Outpatient Medications   Medication Sig Dispense Refill     acetaminophen (TYLENOL) 325 MG tablet Take 2 tablets (650 mg) by mouth every 4 hours as needed for other (mild pain) 100 tablet 0     cholecalciferol (VITAMIN D3) 5000 UNITS CAPS capsule Take by mouth daily       levonorgestrel (MIRENA) 20 MCG/24HR IUD        SYNTHROID 150 MCG tablet MON to SAT 1 tablet/day;SUN 1.5 tablet 96 tablet 3       Social History  Social History     Tobacco Use     Smoking status: Never Smoker     Smokeless tobacco: Never Used   Substance Use Topics     Alcohol use: No     Drug use: No       EXAM  GENERAL: young woman in NAD  /85   Pulse 93   Wt 81.4 kg (179 lb 8 oz)   BMI 27.70 kg/m     SKIN: normal color, temperature, texture   HEENT: PER, no scleral icterus, eyelid retraction, stare, lid lag, proptosis or conjunctival injection.    NECK: supple.  No visible or palpable  masses.   LUNGS: clear to auscultation bilaterally.   CARDIAC: RRR, S1, S2 without murmurs, rubs or gallops.     NEURO: Alert, responds appropriately to questions,moves all extremities, DTRs 2/4, no tremor of the outstretched hand      ENDO THYROID LABS-[facility] Latest Ref Rng & Units 1/22/2020 7/22/2019   TSH 0.40 - 4.00 mU/L 0.10 (L) 0.16 (L)   T4 FREE 0.76 - 1.46 ng/dL 1.71 (H) 1.53 (H)     ENDO THYROID LABS-[facility] Latest Ref Rng & Units 1/22/2019 10/15/2018   TSH 0.40 - 4.00 mU/L 0.30 (L) 0.23 (L)   T4 FREE 0.76 - 1.46 ng/dL 1.72 (H) 1.40       EXAM: Ultrasound soft tissue neck on 7/29/2019 1:25 PM     COMPARISON: 3/6/2018     HISTORY: Papillary thyroid carcinoma, postsurgical hypothyroidism.     FINDINGS: There is no residual thyroid tissue in the thyroidectomy  bed.     Lymph nodes are measured bilaterally with measurements given in  craniocaudal, transverse and AP dimensions as follows:     Right:     Level 2: There is a new 1.2 x 0.7 x 1.4 cm benign-appearing lymph node  with normal fatty hilum.  Level 3: No suspicious node.  Level 4: No suspicious node.  Level 5: No suspicious node.  Level 6: No suspicious node.  Level 7: No suspicious node.     Left:     Level 2: Benign-appearing 1.2 x 0.7 x 1.6 cm lymph node with intact  fatty hilum.  Level 3: Benign-appearing 0.8 x 0.5 x 1.5 cm  lymph node with the  hilum.  Level 4: No suspicious node.  Level 5:   -There is a 1.0 x 0.5 x 0.9 cm indistinct fatty hilum and cortex,  previously 0.5 x 0.2 x 0.7 cm.  -There is a 0.8 x 0.5 x 1.2 cm lymph node with intact fatty hilum,  previously 0.7 0.2 x 0.6 cm.  Level 6: There are 3, benign-appearing lymph nodes measuring 0.4 x 0.4  x 0.8 cm, 0.4 x 0.3 x 0.7 cm, and 0.4 x 0.3 x 0.5 cm.  Level 7: No suspicious node                                                                      IMPRESSION:  1. Soft tissue neck ultrasound with lymph node measurements as  described above. No interval development of suspicious  lymphadenopathy.  2.  Level 5 lymph node with indistinct fatty hilum as above, attention  on follow-up.  3. Benign appearing lymph nodes in the left level 6.     I have personally reviewed the examination and initial interpretation  and I agree with the findings.     JOHN NUNEZ MD

## 2020-01-22 NOTE — PROGRESS NOTES
Endocrine Consult note    Attending Assessment/Plan :     1.  Papillary thyroid carcinoma, unifocal 2.5 cm, isthmus region, LN + including bulky notes up to 1.8 cm with extranodal extension  pT2, pN1, pMx, possible cM1 but this is very unclear .  MACIS3.95 best case, and 7.95 worst case if she has distant mets and incomplete resection  She has had 2 neck operations, 131I Rx x 1.    SIMON has been dropping rapidly, which is good.  Labs, neck US  RTC 6 months    2.  Post surgical hypothyroidism. Labs today. Treat to TSH < 0.4.    Unstable.  She is now on Synthroid 150 * 7.5/week, divided.  Labs today with dose adjustment.      3.  Abnormal  post therapy TBS performed at [facility]  - as per # 4.  Normal 123I TBS here 4/18.       4.  Abnormal bone CT, abnormal bone scan .  A Right sacral ala on bone scan  - sclerotic on CT coronal per my review of images  B) left acetabular on bone scan, sclerotic- - this area is likely too low to correspond to what was seen on 131I .    123I TBS 4/18 doesn't show uptake here, which goes against it being bone mets, but doesn't 100 % exclude it.  Repeat CT pelvis bone without contrast.     5.  Thyroglobulin antibody present  . As per # 1    Katy Bah MD      Cc/ HPI :  Joy presents for follow up of papillary thyroid carcinoma, post surgical hypothyroidism, abnormal bone imaging. I last saw her 7/19    Her treatment course has been as follows:  2 operations for the thyroid cancer:   2/6/17 total thyroidectomy  (Dr Espinoza Taylor, [facility])  unifocal 2.5 cm Papillary thyroid carcinoma, isthmus with extension into right and left lobes.  3/6 lymph nodes were + for metastatic thyroid cancer.  Background lymphocytic thyroiditis.   4/13/17: 2 dose Thyrogen TBS followed by 131I 101 mCi on 4/13/17 ([facility] with abnormal post therapy TBS  raising questions about the left lower quadrant, including left pelvis or GI.  11/8/17: reoperative CND level 6 and 7 ([facility])- 5 nodes + for PCT -  largest 1.8 cm, extranodal extension present, focal  4/5/18 2.98 mCi 123I TBS: negative    We have the following tumor marker data:   2/16/17 Surgery  4/13/17: Tg 0.1, SIMON 210.3  4/13/17: 101 mci 131I  4/21/17: Tg 4.2, SIMON 115, TSH 5.97  8/23/17 : Tg 3.7, SIMON 115 -  11/17 Operation # 2  1/15/18: TSH 0.36  1/30/18 Tg 3.1, SIMON 17, TSH not measured  4/6/18 Tg 3, SIMON 9.4, TSH 23.8 day 5 thyrogen    7/6/18: Tg 1.9, SIMON 7.1, TSH 0.23  1/22/19: Tg 1.5, SIMON 6.5, TSH 0.3, free T4 1.72 -   7/22/19 Tg 1.8, SIMON 4.7, TSH 0.16, free T4 1.53,  Tg 1.8, SIMON 4.7  1/22/2020  Tg 2.2 ng/ml, SIMON  3.5 U/ml, TSH 0.1, free T4 1.71- continued trend of improvement .  Followed appt, not discussed at appt.       images on PACS   4/13/17: 123I 6.6 mCi total body scan (Thyrogen stimulated; [facility]) moderate uptake thyroid bed.   4/13/17: 101 mCi 131I  4/21/17 post treatment TBS -raising questions about the left lower quadrant, including left pelvis or GI.  4/21/17: post therapy scan ; neck uptake.  I wouldn't have called - tiny focus left abdomen lateral  And also 2 foci midline pelvis- one I would attribute to bladder and the other ?   4/28/17 CT pelvis -sclerotic bone lesions as described by radiologist.do not correspond to likely location of 131I uptake seen on outside TBS . Radiologist states some definitely represent bone islands but some are indeterminant .  5/5/17 bone scan: 2 areas noted by radiologist s- anterior column left acetabulum and right sacral body.    3/6/18 neck US :   Left thyroid bed level 6 # 1 0.5 x 0.3 x 0.7 cm hypoechoic mass  Left level 6 # 2 0.5 x 0.2 x 0.6 cm  Left level 2-4 cine has some small LNs with Echogenic hilum  4/5/18 2.98 mCi 123I TBS (thyrogen stimulated):   neck US 7/29/19 compared with 3/18:   Left level 3 # 1 0.8 x 0.5 x 1.5 cm Echogenic hilum   Left level 5 # 1 1 x 0.5 x 0.9 cm   Left level 5 # 2 low 0.8 x 0.5 x 1.2 cm   Left level 6 # 1 0.4 x 0.4 x 0.8 cm  (was 0.5 x 0.3 x 0.7 cm )  Left level 6  # 2 0.4 x 0.3 x 0.7 cm (was 0.5 x 0.2 x 0.6 cm )  Left level 6 # 3  0.4 x 0.3 x 0.5 cm - not seen       REVIEW OF SYSTEMS  Sick forever-- in bed for weeks.  Fever last week; low grade over the weekend to 99  Coughing at night and during the day; trying to be productive.   Was doing intermittent fasting 16/8 - last meal 6-7 PM --   Weight loss   Cardiac: negative  Respiratory: negative  GI: negative   Back pain    Past Medical History  Past Medical History:   Diagnosis Date     Papillary thyroid carcinoma (H) 02/06/2017     PONV (postoperative nausea and vomiting)      Post-surgical hypothyroidism 02/06/2017     Past Surgical History:   Procedure Laterality Date     EXPLORE NECK N/A 11/8/2017    Procedure: EXPLORE NECK;  Re-Operative Central Neck Dissection ;  Surgeon: Yanet Cordova MD;  Location: [facility] OR     total thyroidectomy  02/06/2017    [facility], Dr Espinoza Taylor       Medications    Current Outpatient Medications   Medication Sig Dispense Refill     acetaminophen (TYLENOL) 325 MG tablet Take 2 tablets (650 mg) by mouth every 4 hours as needed for other (mild pain) 100 tablet 0     cholecalciferol (VITAMIN D3) 5000 UNITS CAPS capsule Take by mouth daily       levonorgestrel (MIRENA) 20 MCG/24HR IUD        SYNTHROID 150 MCG tablet MON to SAT 1 tablet/day;SUN 1.5 tablet 96 tablet 3       Social History  Social History     Tobacco Use     Smoking status: Never Smoker     Smokeless tobacco: Never Used   Substance Use Topics     Alcohol use: No     Drug use: No       EXAM  GENERAL: young woman in NAD  /85   Pulse 93   Wt 81.4 kg (179 lb 8 oz)   BMI 27.70 kg/m    SKIN: normal color, temperature, texture   HEENT: PER, no scleral icterus, eyelid retraction, stare, lid lag, proptosis or conjunctival injection.    NECK: supple.  No visible or palpable masses.   LUNGS: clear to auscultation bilaterally.   CARDIAC: RRR, S1, S2 without murmurs, rubs or gallops.     NEURO: Alert, responds appropriately  to questions,moves all extremities, DTRs 2/4, no tremor of the outstretched hand      ENDO THYROID LABS-[facility] Latest Ref Rng & Units 1/22/2020 7/22/2019   TSH 0.40 - 4.00 mU/L 0.10 (L) 0.16 (L)   T4 FREE 0.76 - 1.46 ng/dL 1.71 (H) 1.53 (H)     ENDO THYROID LABS-[facility] Latest Ref Rng & Units 1/22/2019 10/15/2018   TSH 0.40 - 4.00 mU/L 0.30 (L) 0.23 (L)   T4 FREE 0.76 - 1.46 ng/dL 1.72 (H) 1.40       EXAM: Ultrasound soft tissue neck on 7/29/2019 1:25 PM     COMPARISON: 3/6/2018     HISTORY: Papillary thyroid carcinoma, postsurgical hypothyroidism.     FINDINGS: There is no residual thyroid tissue in the thyroidectomy  bed.     Lymph nodes are measured bilaterally with measurements given in  craniocaudal, transverse and AP dimensions as follows:     Right:     Level 2: There is a new 1.2 x 0.7 x 1.4 cm benign-appearing lymph node  with normal fatty hilum.  Level 3: No suspicious node.  Level 4: No suspicious node.  Level 5: No suspicious node.  Level 6: No suspicious node.  Level 7: No suspicious node.     Left:     Level 2: Benign-appearing 1.2 x 0.7 x 1.6 cm lymph node with intact  fatty hilum.  Level 3: Benign-appearing 0.8 x 0.5 x 1.5 cm  lymph node with the  hilum.  Level 4: No suspicious node.  Level 5:   -There is a 1.0 x 0.5 x 0.9 cm indistinct fatty hilum and cortex,  previously 0.5 x 0.2 x 0.7 cm.  -There is a 0.8 x 0.5 x 1.2 cm lymph node with intact fatty hilum,  previously 0.7 0.2 x 0.6 cm.  Level 6: There are 3, benign-appearing lymph nodes measuring 0.4 x 0.4  x 0.8 cm, 0.4 x 0.3 x 0.7 cm, and 0.4 x 0.3 x 0.5 cm.  Level 7: No suspicious node                                                                      IMPRESSION:  1. Soft tissue neck ultrasound with lymph node measurements as  described above. No interval development of suspicious  lymphadenopathy.  2. Level 5 lymph node with indistinct fatty hilum as above, attention  on follow-up.  3. Benign appearing lymph nodes in the left level 6.     I have  personally reviewed the examination and initial interpretation  and I agree with the findings.     JOHN NUNEZ MD

## 2020-01-24 ENCOUNTER — MYC MEDICAL ADVICE (OUTPATIENT)
Dept: ENDOCRINOLOGY | Facility: CLINIC | Age: 45
End: 2020-01-24

## 2020-01-24 DIAGNOSIS — C73 PAPILLARY THYROID CARCINOMA (H): ICD-10-CM

## 2020-01-24 DIAGNOSIS — E89.0 POSTSURGICAL HYPOTHYROIDISM: ICD-10-CM

## 2020-01-24 DIAGNOSIS — M89.9 BONE LESION: ICD-10-CM

## 2020-01-25 NOTE — TELEPHONE ENCOUNTER
SYNTHROID 150 MCG tablet  Last Written Prescription Date:  2/12/19  Last Fill Quantity:96   # refills: 3  Last Office Visit : 1/22/20  Future Office visit:  7/29/20      Routing refill request to provider for review/approval because: provider preference.  Request per my chart

## 2020-01-26 RX ORDER — LEVOTHYROXINE SODIUM 150 MCG
TABLET ORAL
Qty: 96 TABLET | Refills: 3 | Status: SHIPPED | OUTPATIENT
Start: 2020-01-26 | End: 2020-10-13

## 2020-02-03 LAB — LAB SCANNED RESULT: ABNORMAL

## 2020-02-17 ENCOUNTER — ANCILLARY PROCEDURE (OUTPATIENT)
Dept: CT IMAGING | Facility: CLINIC | Age: 45
End: 2020-02-17
Payer: COMMERCIAL

## 2020-02-17 DIAGNOSIS — E89.0 POSTSURGICAL HYPOTHYROIDISM: ICD-10-CM

## 2020-02-17 DIAGNOSIS — C73 PAPILLARY THYROID CARCINOMA (H): ICD-10-CM

## 2020-02-17 DIAGNOSIS — R94.8 ABNORMAL RADIONUCLIDE BONE SCAN: ICD-10-CM

## 2020-10-12 NOTE — PROGRESS NOTES
"Joy Quispe is a 45 year old female who is being evaluated via a billable video visit.      The patient has been notified of following:     \"This video visit will be conducted via a call between you and your physician/provider. We have found that certain health care needs can be provided without the need for an in-person physical exam.  This service lets us provide the care you need with a video conversation.  If a prescription is necessary we can send it directly to your pharmacy.  If lab work is needed we can place an order for that and you can then stop by our lab to have the test done at a later time.    Video visits are billed at different rates depending on your insurance coverage.  Please reach out to your insurance provider with any questions.    If during the course of the call the physician/provider feels a video visit is not appropriate, you will not be charged for this service.\"     Patient has given verbal consent for Video visit? Yes    How would you like to obtain your AVS? MyChart     *Email video visit link to: [email or web address]*    Will anyone else be joining your video visit? No      Video-Visit Details    Type of service:  Video Visit    Distant Location (provider location):  [facility] ENDOCRINOLOGY CLINIC DANIA Schultz MA          "

## 2020-10-13 ENCOUNTER — VIRTUAL VISIT (OUTPATIENT)
Dept: ENDOCRINOLOGY | Facility: CLINIC | Age: 45
End: 2020-10-13
Payer: COMMERCIAL

## 2020-10-13 DIAGNOSIS — M89.9 BONE LESION: ICD-10-CM

## 2020-10-13 DIAGNOSIS — Z87.898 HISTORY OF UNEXPLAINED FEVER: ICD-10-CM

## 2020-10-13 DIAGNOSIS — E89.0 POSTSURGICAL HYPOTHYROIDISM: ICD-10-CM

## 2020-10-13 DIAGNOSIS — C73 PAPILLARY THYROID CARCINOMA (H): Primary | ICD-10-CM

## 2020-10-13 PROCEDURE — 99214 OFFICE O/P EST MOD 30 MIN: CPT | Mod: GT

## 2020-10-13 RX ORDER — LEVOTHYROXINE SODIUM 150 MCG
TABLET ORAL
Qty: 96 TABLET | Refills: 4 | Status: SHIPPED | OUTPATIENT
Start: 2020-10-13 | End: 2021-08-02 | Stop reason: DRUGHIGH

## 2020-10-13 NOTE — PATIENT INSTRUCTIONS
Get labs now and approximately every 6 months.     Get follow up neck US    Call [phone] to schedule    See me approximately yearly or sooner as needed

## 2020-10-13 NOTE — PROGRESS NOTES
Endocrine video visits progress note    Attending Assessment/Plan :     1.  Papillary thyroid carcinoma, unifocal 2.5 cm, isthmus region, LN + including bulky notes up to 1.8 cm with extranodal extension  pT2, pN1, pMx, possible cM1 but this is very unclear .  MACIS3.95 best case, and 7.95 worst case if she has distant mets and incomplete resection  She has had 2 neck operations, 131I Rx x 1.    SIMON has been dropping rapidly, which is good.  Labs, neck US  Labs every 6 months - see me yearly     Review of neck US 11/16/2020:   Right level 4 # 1 00.4 x 0.7 x 1.1 cm -- I am unable to see this on the long cine .  There is no trans level 2-4 cine. I do not see this on the 7/19 leve 2-4 trans cine unless it is at 12:42:30 PM location  ; right level 6-7 cine 3:50:22  Left level 3 # 1 1 x 0.5 x 1.6 cm  (was 0.8 x 0.5 x 1.5 cm)  Left level 4 # 2 0.3  X 0.5 x 0.9 cm   Left level 5 0.3 tall  Left level 6 # 1  0.3 x 0.3 x 0.6 cm (was 0.4 x 0.4 x 0.8 cm)  Left level 6 # 2 0.3 x 0.3 x 0.7 cm (was 0.4 x 0.3 x 0.7 cm )  Left level 6 # 3  0.3 x 0.2 x 0.3 cm (was 0.4 x 0.3 x 0.5 cm )    Labs 11/16/2020 TSH 0.54, free T4 1.48 - Tg pending .    2.  Post surgical hypothyroidism. Labs today. Treat to TSH < 0.4.    on Synthroid 150 * 7.5/week, divided..      3.  Abnormal  post therapy TBS performed at [facility]  - as per # 4.  Normal 123I TBS here 4/18.       4.  Abnormal bone CT, abnormal bone scan .  A Right sacral ala on bone scan  - sclerotic on CT coronal  B) left acetabular on bone scan, sclerotic- - this area is likely too low to correspond to what was seen on 131I .    123I TBS 4/18 doesn't show uptake here, which goes against it being bone mets, but doesn't 100 % exclude it.  Discussed again.  I am reassured by discussion with radiology.  For now I do not recommend biopsy    5.  Thyroglobulin antibody present  . As per # 1    6.  History of illness 1/2020 with fever, cough- followed by DVT and PE.  She is suspicious she had COVID.   We will measure COVID antibody     Due to the COVID 19 pandemic this visit was a video visit in order to help prevent spread of infection in this high risk patient and the general population. The patient gave verbal consent for the visit today.    Start time 1458  Stop time 1509  Total time 11    Katy Bah MD      Cc/ HPI :  Joy presents for follow up of papillary thyroid carcinoma, post surgical hypothyroidism, abnormal bone imaging. I last saw her 1/2020. At that time she was on Synthroid 150 * 7.5/week, and she continues on this. Since our last visit she had the follow up pelvic CT.  The images are stable.  I have discussed with radiology relative to the possibility of additional tests to definitively determine etiology.  It is noted that the radiologists are very reassured by the stability as evidence against it being malignant.  They did not that  Biopsy would not be difficult if we wanted to pursue this but they questioned if it would help us.      Her treatment course has been as follows:  2 operations for the thyroid cancer:   2/6/17 total thyroidectomy  (Dr Espinoza Taylor, [facility])  unifocal 2.5 cm Papillary thyroid carcinoma, isthmus with extension into right and left lobes.  3/6 lymph nodes were + for metastatic thyroid cancer.  Background lymphocytic thyroiditis.   4/13/17: 2 dose Thyrogen TBS followed by 131I 101 mCi on 4/13/17 ([facility] with abnormal post therapy TBS  raising questions about the left lower quadrant, including left pelvis or GI.  11/8/17: reoperative CND level 6 and 7 ([facility])- 5 nodes + for PCT - largest 1.8 cm, extranodal extension present, focal  4/5/18 2.98 mCi 123I TBS: negative    We have the following tumor marker data:   2/16/17 Surgery  4/13/17: Tg 0.1, SIMON 210.3  4/13/17: 101 mci 131I  4/21/17: Tg 4.2, SIMON 115, TSH 5.97  8/23/17 : Tg 3.7, SIMON 115 -  11/17 Operation # 2  1/15/18: TSH 0.36  1/30/18 Tg 3.1, SIMON 17, TSH not measured  4/6/18 Tg 3, SIMON 9.4, TSH 23.8  day 5 thyrogen    7/6/18: Tg 1.9, SIMON 7.1, TSH 0.23  1/22/19: Tg 1.5, SIMON 6.5, TSH 0.3, free T4 1.72 -   7/22/19 Tg 1.8, SIMON 4.7, TSH 0.16, free T4 1.53,  Tg 1.8, SIMON 4.7  1/22/2020  Tg 2.2 ng/ml, SIMON  3.5 U/ml, TSH 0.1, free T4 1.71-   6/23/2020 covid negative      images on PACS   4/13/17: 123I 6.6 mCi total body scan (Thyrogen stimulated; [facility]) moderate uptake thyroid bed.   4/13/17: 101 mCi 131I  4/21/17 post treatment TBS -raising questions about the left lower quadrant, including left pelvis or GI.  4/21/17: post therapy scan ; neck uptake.  I wouldn't have called - tiny focus left abdomen lateral  And also 2 foci midline pelvis- one I would attribute to bladder and the other ?   4/28/17 CT pelvis -sclerotic bone lesions as described by radiologist.do not correspond to likely location of 131I uptake seen on outside TBS . Radiologist states some definitely represent bone islands but some are indeterminant .  5/5/17 bone scan: 2 areas noted by radiologist s- anterior column left acetabulum and right sacral body.    3/6/18 neck US :   Left thyroid bed level 6 # 1 0.5 x 0.3 x 0.7 cm hypoechoic mass  Left level 6 # 2 0.5 x 0.2 x 0.6 cm  Left level 2-4 cine has some small LNs with Echogenic hilum  4/5/18 2.98 mCi 123I TBS (thyrogen stimulated):   neck US 7/29/19 compared with 3/18:   Left level 3 # 1 0.8 x 0.5 x 1.5 cm Echogenic hilum   Left level 5 # 1 1 x 0.5 x 0.9 cm   Left level 5 # 2 low 0.8 x 0.5 x 1.2 cm   Left level 6 # 1 0.4 x 0.4 x 0.8 cm  (was 0.5 x 0.3 x 0.7 cm )  Left level 6 # 2 0.4 x 0.3 x 0.7 cm (was 0.5 x 0.2 x 0.6 cm )  Left level 6 # 3  0.4 x 0.3 x 0.5 cm - not seen   2/17/2020 CT pelvis    REVIEW OF SYSTEMS  Feeling fairly well   Weight is stable - no longer on intermittent fasting  Cardiac: negative  Respiratory: negative; no longer has the coughing  Hospitalized for blood clot leg and PE in January 25-26 --she now thinks she had COVID. She has been on anticoagulation since then.  Her  leg has reduced toward normal size now.     GI: negative  No bone pain    Past Medical History  Past Medical History:   Diagnosis Date     Acute deep vein thrombosis (DVT) of left lower extremity (H) 01/25/2020     Acute pulmonary embolism without acute cor pulmonale (H) 01/25/2020     Papillary thyroid carcinoma (H) 02/06/2017     PONV (postoperative nausea and vomiting)      Post-surgical hypothyroidism 02/06/2017     Past Surgical History:   Procedure Laterality Date     EXPLORE NECK N/A 11/8/2017    Procedure: EXPLORE NECK;  Re-Operative Central Neck Dissection ;  Surgeon: Yanet Cordova MD;  Location: [facility] OR     total thyroidectomy  02/06/2017    [facility], Dr Espinoza Taylor       Medications    Current Outpatient Medications   Medication Sig Dispense Refill     acetaminophen (TYLENOL) 325 MG tablet Take 2 tablets (650 mg) by mouth every 4 hours as needed for other (mild pain) 100 tablet 0     apixaban ANTICOAGULANT (ELIQUIS) 5 MG tablet Take 5 mg by mouth 2 times daily       cholecalciferol (VITAMIN D3) 5000 UNITS CAPS capsule Take by mouth daily       levonorgestrel (MIRENA) 20 MCG/24HR IUD        SYNTHROID 150 MCG tablet MON to SAT 1 tablet/day;SUN 1.5 tablet 96 tablet 4     Vitamin D is not every day    Social History  Social History     Tobacco Use     Smoking status: Never Smoker     Smokeless tobacco: Never Used   Substance Use Topics     Alcohol use: No     Drug use: No     Working from home; the kids are at home in high  school - they are also on the wi fi     EXAM  GENERAL: young woman in NAD  There were no vitals taken for this visit.   BP Readings from Last 1 Encounters:   01/22/20 127/85      Pulse Readings from Last 1 Encounters:   01/22/20 93      Resp Readings from Last 1 Encounters:   11/08/17 16      Temp Readings from Last 1 Encounters:   11/08/17 98.3  F (36.8  C) (Axillary)      SpO2 Readings from Last 1 Encounters:   11/08/17 95%      Wt Readings from Last 1 Encounters:  "  01/22/20 81.4 kg (179 lb 8 oz)      Ht Readings from Last 1 Encounters:   07/22/19 1.715 m (5' 7.5\")   GENERAL: no distress; she appears well  SKIN: Visible skin clear. No significant rash, abnormal pigmentation or lesions.  EYES: Eyes grossly normal to inspection.   RESP: No audible wheeze, cough, or visible cyanosis.  No visible retractions or increased work of breathing.    NEURO: Cranial nerves grossly intact.  Awake, alert, responds appropriately to questions.  Mentation and speech fluent.  PSYCH:affect normal, judgement and insight intact, and appearance well-groomed.      ENDO THYROID LABS-[facility] Latest Ref Rng & Units 1/22/2020 7/22/2019   TSH 0.40 - 4.00 mU/L 0.10 (L) 0.16 (L)   T4 FREE 0.76 - 1.46 ng/dL 1.71 (H) 1.53 (H)     ENDO THYROID LABS-[facility] Latest Ref Rng & Units 1/22/2019   TSH 0.40 - 4.00 mU/L 0.30 (L)   T4 FREE 0.76 - 1.46 ng/dL 1.72 (H)       EXAM: CT PELVIS BONE WO CONTRAST  2/17/2020 10:10 AM  HISTORY: thyroid cancer; abnormal imaging; Papillary thyroid carcinoma  (H); Postsurgical hypothyroidism; Abnormal radionuclide bone scanCOMPARISON: Bone scan 4/5/2018, 5/5/2017, CT abdomen pelvis 4/28/2017   TECHNIQUE: CT imaging of the pelvis without IV contrast. Multiplanarreconstructions.   FINDINGS: No acute osseous abnormality.Multiple lobular sclerotic foci throughout the pelvis are again noted.  Lesion within the medial right ilium today measures up to 1.7 cm inthe axial plane, stable compared to 4/5/2017. Lesion within the right  S1 sacral ala today measures up to 7 mm, stable compared to 4/5/2017.Left sacral richard lesion on series 2 image 114  measures up to 11 mm,stable. Subtle lesion in the right inferior pubic ramus on axial [id number] measuring up to 5 mm, stable. No new concerning bony lesion.   Degenerative changes of the hips. Mild degenerative changes of thesacroiliac joints without evidence of inflammatory sacroiliitis.   No free fluid in the visualized pelvis. Intrauterine device " noted.Colonic diverticula. Muscle bulk is normal.    IMPRESSION: Nonspecific sclerotic bony foci throughout the pelvis are  not substantially changed compared to earliest available CT comparison  4/28/2017.  CAITLIN CONNOLLY MD (Joe)      EXAMINATION: US HEAD NECK SOFT TISSUE, 11/16/2020 4:38 PM      COMPARISON: 7/29/2019     HISTORY: Papillary thyroid carcinoma (H); Postsurgical hypothyroidism     FINDINGS:     Lymph nodes are measured bilaterally with measurements given in  craniocaudal, transverse and AP dimensions as follows:     Right:  Level 2: 1.3 x 0.5 x 1.5 cm ovoid hypoechoic lymph node with a fatty  hilum, unchanged.  Level 4: 0.4 x 0.7 x 1.1 cm ovoid hypoechoic lymph node with  suggestion of a fatty hilum, similar to prior when compared on cine  images.     Left:  Level 2: 1.2 x 0.7 x 1.7 cm ovoid hypoechoic lymph node with a fatty  hilum, unchanged.  Level 3: 1.0 x 0.5 x 1.6 cm ovoid hypoechoic lymph node with  suggestion of a fatty hilum (incorrectly labeled as on the right side  on some PACS images), slightly increased since prior when it measured  0.8 x 0.5 x 1.5 cm.   Level 4: 0.3 x 0.5 x 0.9 cm ovoid hypoechoic lymph node with  suggestion of a fatty hilum, minimally increased in size when compared  to prior on cine images.  Level 5: Subcentimeter morphologically normal lymph nodes noted, not  significantly changed.  Level 6: Tiny hypoechoic foci in the left thyroidectomy bed,  unchanged.                                                                      IMPRESSION:  Soft tissue neck ultrasound with lymph node measurements as described  above. Slightly increased left level 3 and left level 4 lymph nodes  without highly suspicious features.     RERE PATRICK, DO

## 2020-10-13 NOTE — LETTER
"10/13/2020       RE: Joy Quispe  [address] [place] [address]     Dear Colleague,    Thank you for referring your patient, Joy Quispe, to the [facility] [place] at [facility]. Please see a copy of my visit note below.    Joy Quispe is a 45 year old female who is being evaluated via a billable video visit.      The patient has been notified of following:     \"This video visit will be conducted via a call between you and your physician/provider. We have found that certain health care needs can be provided without the need for an in-person physical exam.  This service lets us provide the care you need with a video conversation.  If a prescription is necessary we can send it directly to your pharmacy.  If lab work is needed we can place an order for that and you can then stop by our lab to have the test done at a later time.    Video visits are billed at different rates depending on your insurance coverage.  Please reach out to your insurance provider with any questions.    If during the course of the call the physician/provider feels a video visit is not appropriate, you will not be charged for this service.\"     Patient has given verbal consent for Video visit? Yes    How would you like to obtain your AVS? MyChart     *Email video visit link to: [email or web address]*    Will anyone else be joining your video visit? No      Video-Visit Details    Type of service:  Video Visit    Distant Location (provider location):  [facility] [place]     Kori Schultz MA            Endocrine video visits progress note    Attending Assessment/Plan :     1.  Papillary thyroid carcinoma, unifocal 2.5 cm, isthmus region, LN + including bulky notes up to 1.8 cm with extranodal extension  pT2, pN1, pMx, possible cM1 but this is very unclear .  MACIS3.95 best case, and 7.95 worst case if she has distant mets and " incomplete resection  She has had 2 neck operations, 131I Rx x 1.    SIMON has been dropping rapidly, which is good.  Labs, neck US  Labs every 6 months - see me yearly     2.  Post surgical hypothyroidism. Labs today. Treat to TSH < 0.4.    on Synthroid 150 * 7.5/week, divided..      3.  Abnormal  post therapy TBS performed at [facility]  - as per # 4.  Normal 123I TBS here 4/18.       4.  Abnormal bone CT, abnormal bone scan .  A Right sacral ala on bone scan  - sclerotic on CT coronal  B) left acetabular on bone scan, sclerotic- - this area is likely too low to correspond to what was seen on 131I .    123I TBS 4/18 doesn't show uptake here, which goes against it being bone mets, but doesn't 100 % exclude it.  Discussed again.  I am reassured by discussion with radiology.  For now I do not recommend biopsy    5.  Thyroglobulin antibody present  . As per # 1    6.  History of illness 1/2020 with fever, cough- followed by DVT and PE.  She is suspicious she had COVID.  We will measure COVID antibody     Due to the COVID 19 pandemic this visit was a video visit in order to help prevent spread of infection in this high risk patient and the general population. The patient gave verbal consent for the visit today.    Start time 1458  Stop time 1509  Total time 11    Katy Bah MD      Cc/ HPI :  Joy presents for follow up of papillary thyroid carcinoma, post surgical hypothyroidism, abnormal bone imaging. I last saw her 1/2020. At that time she was on Synthroid 150 * 7.5/week, and she continues on this. Since our last visit she had the follow up pelvic CT.  The images are stable.  I have discussed with radiology relative to the possibility of additional tests to definitively determine etiology.  It is noted that the radiologists are very reassured by the stability as evidence against it being malignant.  They did not that  Biopsy would not be difficult if we wanted to pursue this but they questioned if it would help  us.      Her treatment course has been as follows:  2 operations for the thyroid cancer:   2/6/17 total thyroidectomy  (Dr Espinoza Taylor, [facility])  unifocal 2.5 cm Papillary thyroid carcinoma, isthmus with extension into right and left lobes.  3/6 lymph nodes were + for metastatic thyroid cancer.  Background lymphocytic thyroiditis.   4/13/17: 2 dose Thyrogen TBS followed by 131I 101 mCi on 4/13/17 ([facility] with abnormal post therapy TBS  raising questions about the left lower quadrant, including left pelvis or GI.  11/8/17: reoperative CND level 6 and 7 ([facility])- 5 nodes + for PCT - largest 1.8 cm, extranodal extension present, focal  4/5/18 2.98 mCi 123I TBS: negative    We have the following tumor marker data:   2/16/17 Surgery  4/13/17: Tg 0.1, SIMON 210.3  4/13/17: 101 mci 131I  4/21/17: Tg 4.2, SIMON 115, TSH 5.97  8/23/17 : Tg 3.7, SIMON 115 -  11/17 Operation # 2  1/15/18: TSH 0.36  1/30/18 Tg 3.1, SIMON 17, TSH not measured  4/6/18 Tg 3, SIMON 9.4, TSH 23.8 day 5 thyrogen    7/6/18: Tg 1.9, SIMON 7.1, TSH 0.23  1/22/19: Tg 1.5, SIMON 6.5, TSH 0.3, free T4 1.72 -   7/22/19 Tg 1.8, SIMON 4.7, TSH 0.16, free T4 1.53,  Tg 1.8, SIMON 4.7  1/22/2020  Tg 2.2 ng/ml, SIMON  3.5 U/ml, TSH 0.1, free T4 1.71-   6/23/2020 covid negative      images on PACS   4/13/17: 123I 6.6 mCi total body scan (Thyrogen stimulated; [facility]) moderate uptake thyroid bed.   4/13/17: 101 mCi 131I  4/21/17 post treatment TBS -raising questions about the left lower quadrant, including left pelvis or GI.  4/21/17: post therapy scan ; neck uptake.  I wouldn't have called - tiny focus left abdomen lateral  And also 2 foci midline pelvis- one I would attribute to bladder and the other ?   4/28/17 CT pelvis -sclerotic bone lesions as described by radiologist.do not correspond to likely location of 131I uptake seen on outside TBS . Radiologist states some definitely represent bone islands but some are indeterminant .  5/5/17 bone scan: 2 areas noted  by radiologist s- anterior column left acetabulum and right sacral body.    3/6/18 neck US :   Left thyroid bed level 6 # 1 0.5 x 0.3 x 0.7 cm hypoechoic mass  Left level 6 # 2 0.5 x 0.2 x 0.6 cm  Left level 2-4 cine has some small LNs with Echogenic hilum  4/5/18 2.98 mCi 123I TBS (thyrogen stimulated):   neck US 7/29/19 compared with 3/18:   Left level 3 # 1 0.8 x 0.5 x 1.5 cm Echogenic hilum   Left level 5 # 1 1 x 0.5 x 0.9 cm   Left level 5 # 2 low 0.8 x 0.5 x 1.2 cm   Left level 6 # 1 0.4 x 0.4 x 0.8 cm  (was 0.5 x 0.3 x 0.7 cm )  Left level 6 # 2 0.4 x 0.3 x 0.7 cm (was 0.5 x 0.2 x 0.6 cm )  Left level 6 # 3  0.4 x 0.3 x 0.5 cm - not seen   2/17/2020 CT pelvis    REVIEW OF SYSTEMS  Feeling fairly well   Weight is stable - no longer on intermittent fasting  Cardiac: negative  Respiratory: negative; no longer has the coughing  Hospitalized for blood clot leg and PE in January 25-26 --she now thinks she had COVID. She has been on anticoagulation since then.  Her leg has reduced toward normal size now.     GI: negative  No bone pain    Past Medical History  Past Medical History:   Diagnosis Date     Acute deep vein thrombosis (DVT) of left lower extremity (H) 01/25/2020     Acute pulmonary embolism without acute cor pulmonale (H) 01/25/2020     Papillary thyroid carcinoma (H) 02/06/2017     PONV (postoperative nausea and vomiting)      Post-surgical hypothyroidism 02/06/2017     Past Surgical History:   Procedure Laterality Date     EXPLORE NECK N/A 11/8/2017    Procedure: EXPLORE NECK;  Re-Operative Central Neck Dissection ;  Surgeon: Yanet Cordova MD;  Location: [facility] OR     total thyroidectomy  02/06/2017    [facility], Dr Espinoza Taylor       Medications    Current Outpatient Medications   Medication Sig Dispense Refill     acetaminophen (TYLENOL) 325 MG tablet Take 2 tablets (650 mg) by mouth every 4 hours as needed for other (mild pain) 100 tablet 0     apixaban ANTICOAGULANT (ELIQUIS) 5 MG tablet  "Take 5 mg by mouth 2 times daily       cholecalciferol (VITAMIN D3) 5000 UNITS CAPS capsule Take by mouth daily       levonorgestrel (MIRENA) 20 MCG/24HR IUD        SYNTHROID 150 MCG tablet MON to SAT 1 tablet/day;SUN 1.5 tablet 96 tablet 4     Vitamin D is not every day    Social History  Social History     Tobacco Use     Smoking status: Never Smoker     Smokeless tobacco: Never Used   Substance Use Topics     Alcohol use: No     Drug use: No     Working from home; the kids are at home in high  school - they are also on the wi fi     EXAM  GENERAL: young woman in NAD  There were no vitals taken for this visit.   BP Readings from Last 1 Encounters:   01/22/20 127/85      Pulse Readings from Last 1 Encounters:   01/22/20 93      Resp Readings from Last 1 Encounters:   11/08/17 16      Temp Readings from Last 1 Encounters:   11/08/17 98.3  F (36.8  C) (Axillary)      SpO2 Readings from Last 1 Encounters:   11/08/17 95%      Wt Readings from Last 1 Encounters:   01/22/20 81.4 kg (179 lb 8 oz)      Ht Readings from Last 1 Encounters:   07/22/19 1.715 m (5' 7.5\")   GENERAL: no distress; she appears well  SKIN: Visible skin clear. No significant rash, abnormal pigmentation or lesions.  EYES: Eyes grossly normal to inspection.   RESP: No audible wheeze, cough, or visible cyanosis.  No visible retractions or increased work of breathing.    NEURO: Cranial nerves grossly intact.  Awake, alert, responds appropriately to questions.  Mentation and speech fluent.  PSYCH:affect normal, judgement and insight intact, and appearance well-groomed.      ENDO THYROID LABS-[facility] Latest Ref Rng & Units 1/22/2020 7/22/2019   TSH 0.40 - 4.00 mU/L 0.10 (L) 0.16 (L)   T4 FREE 0.76 - 1.46 ng/dL 1.71 (H) 1.53 (H)     ENDO THYROID LABS-[facility] Latest Ref Rng & Units 1/22/2019   TSH 0.40 - 4.00 mU/L 0.30 (L)   T4 FREE 0.76 - 1.46 ng/dL 1.72 (H)       EXAM: CT PELVIS BONE WO CONTRAST  2/17/2020 10:10 AM  HISTORY: thyroid cancer; abnormal imaging; " Papillary thyroid carcinoma  (H); Postsurgical hypothyroidism; Abnormal radionuclide bone scanCOMPARISON: Bone scan 4/5/2018, 5/5/2017, CT abdomen pelvis 4/28/2017   TECHNIQUE: CT imaging of the pelvis without IV contrast. Multiplanarreconstructions.   FINDINGS: No acute osseous abnormality.Multiple lobular sclerotic foci throughout the pelvis are again noted.  Lesion within the medial right ilium today measures up to 1.7 cm inthe axial plane, stable compared to 4/5/2017. Lesion within the right  S1 sacral ala today measures up to 7 mm, stable compared to 4/5/2017.Left sacral richard lesion on series 2 image 114  measures up to 11 mm,stable. Subtle lesion in the right inferior pubic ramus on axial [id number] measuring up to 5 mm, stable. No new concerning bony lesion.   Degenerative changes of the hips. Mild degenerative changes of thesacroiliac joints without evidence of inflammatory sacroiliitis.   No free fluid in the visualized pelvis. Intrauterine device noted.Colonic diverticula. Muscle bulk is normal.    IMPRESSION: Nonspecific sclerotic bony foci throughout the pelvis are  not substantially changed compared to earliest available CT comparison  4/28/2017.  CAITLIN CONNOLLY MD (Joe)

## 2020-11-16 ENCOUNTER — ANCILLARY PROCEDURE (OUTPATIENT)
Dept: ULTRASOUND IMAGING | Facility: CLINIC | Age: 45
End: 2020-11-16
Payer: COMMERCIAL

## 2020-11-16 DIAGNOSIS — E89.0 POSTSURGICAL HYPOTHYROIDISM: ICD-10-CM

## 2020-11-16 DIAGNOSIS — M89.9 BONE LESION: ICD-10-CM

## 2020-11-16 DIAGNOSIS — C73 PAPILLARY THYROID CARCINOMA (H): ICD-10-CM

## 2020-11-16 DIAGNOSIS — Z87.898 HISTORY OF UNEXPLAINED FEVER: ICD-10-CM

## 2020-11-16 LAB
T4 FREE SERPL-MCNC: 1.48 NG/DL (ref 0.76–1.46)
TSH SERPL DL<=0.005 MIU/L-ACNC: 0.54 MU/L (ref 0.4–4)

## 2020-11-16 PROCEDURE — 99000 SPECIMEN HANDLING OFFICE-LAB: CPT | Performed by: PATHOLOGY

## 2020-11-16 PROCEDURE — 86800 THYROGLOBULIN ANTIBODY: CPT | Mod: 90 | Performed by: PATHOLOGY

## 2020-11-16 PROCEDURE — 76536 US EXAM OF HEAD AND NECK: CPT | Performed by: RADIOLOGY

## 2020-11-16 PROCEDURE — 36415 COLL VENOUS BLD VENIPUNCTURE: CPT | Performed by: PATHOLOGY

## 2020-11-16 PROCEDURE — 99N1030 PR STATISTIC REMEASURE THYROGLOBULIN: Mod: 90 | Performed by: PATHOLOGY

## 2020-11-16 PROCEDURE — 84439 ASSAY OF FREE THYROXINE: CPT | Performed by: PATHOLOGY

## 2020-11-16 PROCEDURE — 86769 SARS-COV-2 COVID-19 ANTIBODY: CPT | Performed by: PATHOLOGY

## 2020-11-16 PROCEDURE — 84432 ASSAY OF THYROGLOBULIN: CPT | Mod: 90 | Performed by: PATHOLOGY

## 2020-11-16 PROCEDURE — 84443 ASSAY THYROID STIM HORMONE: CPT | Performed by: PATHOLOGY

## 2020-11-17 LAB
COVID-19 SPIKE RBD ABY TITER: NORMAL
COVID-19 SPIKE RBD ABY: NEGATIVE

## 2020-11-29 ENCOUNTER — HEALTH MAINTENANCE LETTER (OUTPATIENT)
Age: 45
End: 2020-11-29

## 2020-12-02 LAB — LAB SCANNED RESULT: ABNORMAL

## 2021-02-14 ENCOUNTER — HEALTH MAINTENANCE LETTER (OUTPATIENT)
Age: 46
End: 2021-02-14

## 2021-05-10 NOTE — PROGRESS NOTES
Joy is a 46 year old who is being evaluated via a billable video visit.      How would you like to obtain your AVS? Fliggo     Email video visit link to: [email or web address]    Will anyone else be joining your video visit? No    Kori FUENTES MA

## 2021-05-12 ENCOUNTER — VIRTUAL VISIT (OUTPATIENT)
Dept: ENDOCRINOLOGY | Facility: CLINIC | Age: 46
End: 2021-05-12
Payer: COMMERCIAL

## 2021-05-12 DIAGNOSIS — C73 PAPILLARY THYROID CARCINOMA (H): Primary | ICD-10-CM

## 2021-05-12 DIAGNOSIS — E89.0 POSTSURGICAL HYPOTHYROIDISM: ICD-10-CM

## 2021-05-12 DIAGNOSIS — R76.8 THYROGLOBULIN ANTIBODY POSITIVE: ICD-10-CM

## 2021-05-12 PROCEDURE — 99214 OFFICE O/P EST MOD 30 MIN: CPT | Mod: GT

## 2021-05-12 NOTE — PROGRESS NOTES
Endocrine video visits progress note    Attending Assessment/Plan :     1.  Papillary thyroid carcinoma, unifocal 2.5 cm, isthmus region, LN + including bulky notes up to 1.8 cm with extranodal extension  pT2, pN1, pMx, possible cM1 but this is very unclear .  MACIS3.95 best case, and 7.95 worst case if she has distant mets and incomplete resection  She has had 2 neck operations, 131I Rx x 1.    Labs every 6 months  See me every 6-12 months    Addendum:   Labs 7/16/2021: Tg 1.9, SIMON 3.9, TSH 0.64, free T4 1.47    2.  Post surgical hypothyroidism. Labs today. Treat to TSH < 0.4.    on Synthroid 150 * 7.5/week, divided..      3.  Abnormal  post therapy TBS performed at [facility]  - as per # 4.  Normal 123I TBS here 4/18.       4.  Abnormal bone CT, abnormal bone scan .  A Right sacral ala on bone scan  - sclerotic on CT coronal  B) left acetabular on bone scan, sclerotic- - this area is likely too low to correspond to what was seen on 131I .    123I TBS 4/18 doesn't show uptake here, which goes against it being bone mets, but doesn't 100 % exclude it.  We have not performed PET - this would be a consideration for the future.     5.  Thyroglobulin antibody present  . As per # 1    Due to the COVID 19 pandemic this visit was a telephone/video visit in order to help prevent spread of infection in this high risk patient and the general population. The patient gave verbal consent for the visit today.    I have independently reviewed and interpreted labs, imaging as indicated.     Chart review/prep time 1  [id number]  Visit Start time 1328  Visit Stop time  1342  32__ minutes spent on the date of the encounter doing chart review, history and exam, documentation and further activities as noted above.    Katy Bah MD      Cc/ HPI :  Joy presents for follow up of papillary thyroid carcinoma, post surgical hypothyroidism, abnormal bone imaging. I last saw her 10/2020. At that time she was on Synthroid 150 * 7.5/week, and  she continues on this. Since our last visit she had neck US and labs 11/2020    Her treatment course has been as follows:  2 operations for the thyroid cancer:   2/6/17 total thyroidectomy  (Dr Espinoza Taylor, [facility])  unifocal 2.5 cm Papillary thyroid carcinoma, isthmus with extension into right and left lobes.  3/6 lymph nodes were + for metastatic thyroid cancer.  Background lymphocytic thyroiditis.   4/13/17: 2 dose Thyrogen TBS followed by 131I 101 mCi on 4/13/17 ([facility] with abnormal post therapy TBS  raising questions about the left lower quadrant, including left pelvis or GI.  11/8/17: reoperative CND level 6 and 7 ([facility])- 5 nodes + for PCT - largest 1.8 cm, extranodal extension present, focal  4/5/18 2.98 mCi 123I TBS: negative    We have the following tumor marker data:   2/16/17 Surgery  4/13/17: Tg 0.1, SIMON 210.3  4/13/17: 101 mci 131I  4/21/17: Tg 4.2, SIMON 115, TSH 5.97  8/23/17 : Tg 3.7, SIMON 115 -  11/17 Operation # 2  1/15/18: TSH 0.36  1/30/18 Tg 3.1, SIMON 17, TSH not measured  4/6/18 Tg 3, SIMON 9.4, TSH 23.8 day 5 thyrogen    7/6/18: Tg 1.9, SIMON 7.1, TSH 0.23  1/22/19: Tg 1.5, SIMON 6.5, TSH 0.3, free T4 1.72 -   7/22/19 Tg 1.8, SIMON 4.7, TSH 0.16, free T4 1.53,  Tg 1.8, SIMON 4.7  1/22/2020  Tg 2.2 ng/ml, SIMON  3.5 U/ml, TSH 0.1, free T4 1.71-   6/23/2020 covid negative   11/16/2020 Tg 2.3, SIMON 3.8, TSH 0.54, free T4 1.48 -        images on PACS   4/13/17: 123I 6.6 mCi total body scan (Thyrogen stimulated; [facility]) moderate uptake thyroid bed.   4/13/17: 101 mCi 131I  4/21/17 post treatment TBS -raising questions about the left lower quadrant, including left pelvis or GI.  4/21/17: post therapy scan ; neck uptake.  I wouldn't have called - tiny focus left abdomen lateral  And also 2 foci midline pelvis- one I would attribute to bladder and the other ?   4/28/17 CT pelvis -sclerotic bone lesions as described by radiologist.do not correspond to likely location of 131I uptake seen on  outside TBS . Radiologist states some definitely represent bone islands but some are indeterminant .  5/5/17 bone scan: 2 areas noted by radiologist s- anterior column left acetabulum and right sacral body.    4/5/18 2.98 mCi 123I TBS (thyrogen stimulated):   2/17/2020 CT pelvis - stable.  Nonspecific sclerotic bony foci throughout pelvis not substantially changed compared with 4/2017  neck US 11/16/2020:   Right level 4 # 1 00.4 x 0.7 x 1.1 cm -- I am unable to see this on the long cine .  There is no trans level 2-4 cine. I do not see this on the 7/19 leve 2-4 trans cine unless it is at 12:42:30 PM location  ; right level 6-7 cine 3:50:22  Left level 3 # 1 1 x 0.5 x 1.6 cm  (was 0.8 x 0.5 x 1.5 cm)  Left level 4 # 2 0.3  X 0.5 x 0.9 cm   Left level 5 0.3 tall  Left level 6 # 1  0.3 x 0.3 x 0.6 cm (was 0.4 x 0.4 x 0.8 cm)  Left level 6 # 2 0.3 x 0.3 x 0.7 cm (was 0.4 x 0.3 x 0.7 cm )  Left level 6 # 3  0.3 x 0.2 x 0.3 cm (was 0.4 x 0.3 x 0.5 cm )      REVIEW OF SYSTEMS  She has had covid vaccine  Still on anticoagulation  Energy pretty good  Don't get out as much; not much exercise  Sleep is OK  Cardiac: negative  Respiratory:  Negative  GI: negative  No bone pain  Less headaches  No periods with IuD  Worries about things    Past Medical History  Past Medical History:   Diagnosis Date     Acute deep vein thrombosis (DVT) of left lower extremity (H) 01/25/2020     Acute pulmonary embolism without acute cor pulmonale (H) 01/25/2020     Papillary thyroid carcinoma (H) 02/06/2017     PONV (postoperative nausea and vomiting)      Post-surgical hypothyroidism 02/06/2017     Past Surgical History:   Procedure Laterality Date     EXPLORE NECK N/A 11/8/2017    Procedure: EXPLORE NECK;  Re-Operative Central Neck Dissection ;  Surgeon: Yanet Cordova MD;  Location: [facility] OR     total thyroidectomy  02/06/2017    [facility], Dr Espinoza Taylor       Medications    Current Outpatient Medications   Medication Sig Dispense  "Refill     acetaminophen (TYLENOL) 325 MG tablet Take 2 tablets (650 mg) by mouth every 4 hours as needed for other (mild pain) 100 tablet 0     apixaban ANTICOAGULANT (ELIQUIS) 5 MG tablet Take 5 mg by mouth 2 times daily       levonorgestrel (MIRENA) 20 MCG/24HR IUD        SYNTHROID 150 MCG tablet MON to SAT 1 tablet/day;SUN 1.5 tablet 96 tablet 4     cholecalciferol (VITAMIN D3) 5000 UNITS CAPS capsule Take by mouth daily       Vitamin D is not every day    Social History  Social History     Tobacco Use     Smoking status: Never Smoker     Smokeless tobacco: Never Used   Substance Use Topics     Alcohol use: No     Drug use: No     Working from home; kids are in school remotely still;     EXAM  GENERAL: young woman in NAD  There were no vitals taken for this visit.   BP Readings from Last 1 Encounters:   01/22/20 127/85      Pulse Readings from Last 1 Encounters:   01/22/20 93      Resp Readings from Last 1 Encounters:   11/08/17 16      Temp Readings from Last 1 Encounters:   11/08/17 98.3  F (36.8  C) (Axillary)      SpO2 Readings from Last 1 Encounters:   11/08/17 95%      Wt Readings from Last 1 Encounters:   01/22/20 81.4 kg (179 lb 8 oz)      Ht Readings from Last 1 Encounters:   07/22/19 1.715 m (5' 7.5\")   GENERAL: no distress; she appears well  SKIN: Visible skin clear.   EYES: Eyes grossly normal to inspection.   RESP: No audible wheeze, cough, or visible cyanosis.  No visible retractions or increased work of breathing.    NEURO:   Awake, alert, responds appropriately to questions.  Mentation and speech fluent.  PSYCH:affect normal,  and appearance well-groomed.        ENDO THYROID LABS-[facility] Latest Ref Rng & Units 11/16/2020 1/22/2020   TSH 0.40 - 4.00 mU/L 0.54 0.10 (L)   T4 FREE 0.76 - 1.46 ng/dL 1.48 (H) 1.71 (H)       EXAMINATION: US HEAD NECK SOFT TISSUE, 11/16/2020 4:38 PM COMPARISON: 7/29/2019HISTORY: Papillary thyroid carcinoma (H); Postsurgical hypothyroidism   FINDINGS:  Lymph nodes are " measured bilaterally with measurements given incraniocaudal, transverse and AP dimensions as follows:     Right:  Level 2: 1.3 x 0.5 x 1.5 cm ovoid hypoechoic lymph node with a fattyhilum, unchanged.  Level 4: 0.4 x 0.7 x 1.1 cm ovoid hypoechoic lymph node withsuggestion of a fatty hilum, similar to prior when compared on cineimages.     Left:  Level 2: 1.2 x 0.7 x 1.7 cm ovoid hypoechoic lymph node with a fattyhilum, unchanged.  Level 3: 1.0 x 0.5 x 1.6 cm ovoid hypoechoic lymph node withsuggestion of a fatty hilum (incorrectly labeled as on the right sideon some PACS images), slightly increased since prior when it measured0.8 x 0.5 x 1.5 cm.   Level 4: 0.3 x 0.5 x 0.9 cm ovoid hypoechoic lymph node withsuggestion of a fatty hilum, minimally increased in size when comparedto prior on cine images.  Level 5: Subcentimeter morphologically normal lymph nodes noted, notsignificantly changed.  Level 6: Tiny hypoechoic foci in the left thyroidectomy bed,unchanged.                                                                  IMPRESSION:  Soft tissue neck ultrasound with lymph node measurements as describedabove. Slightly increased left level 3 and left level 4 lymph nodeswithout highly suspicious features.  RERE PATRICK, DO

## 2021-05-12 NOTE — LETTER
5/12/2021       RE: Joy Quispe  [address]  [place] [address]     Dear Colleague,    Thank you for referring your patient, Joy Quispe, to the [facility] [place] at [facility]. Please see a copy of my visit note below.    Joy is a 46 year old who is being evaluated via a billable video visit.      How would you like to obtain your AVS? [employer] video visit link to: [email or web address]    Will anyone else be joining your video visit? No    Kori FUENTES MA            Endocrine video visits progress note    Attending Assessment/Plan :     1.  Papillary thyroid carcinoma, unifocal 2.5 cm, isthmus region, LN + including bulky notes up to 1.8 cm with extranodal extension  pT2, pN1, pMx, possible cM1 but this is very unclear .  MACIS3.95 best case, and 7.95 worst case if she has distant mets and incomplete resection  She has had 2 neck operations, 131I Rx x 1.    Labs every 6 months  See me every 6-12 months    2.  Post surgical hypothyroidism. Labs today. Treat to TSH < 0.4.    on Synthroid 150 * 7.5/week, divided..      3.  Abnormal  post therapy TBS performed at [facility]  - as per # 4.  Normal 123I TBS here 4/18.       4.  Abnormal bone CT, abnormal bone scan .  A Right sacral ala on bone scan  - sclerotic on CT coronal  B) left acetabular on bone scan, sclerotic- - this area is likely too low to correspond to what was seen on 131I .    123I TBS 4/18 doesn't show uptake here, which goes against it being bone mets, but doesn't 100 % exclude it.  We have not performed PET - this would be a consideration for the future.     5.  Thyroglobulin antibody present  . As per # 1    Due to the COVID 19 pandemic this visit was a telephone/video visit in order to help prevent spread of infection in this high risk patient and the general population. The patient gave verbal consent for the visit today.    I have independently  reviewed and interpreted labs, imaging as indicated.     Chart review/prep time 1  14-0-1427  Visit Start time 1328  Visit Stop time  1342  32__ minutes spent on the date of the encounter doing chart review, history and exam, documentation and further activities as noted above.    Katy Bah MD      Cc/ HPI :  Joy presents for follow up of papillary thyroid carcinoma, post surgical hypothyroidism, abnormal bone imaging. I last saw her 10/2020. At that time she was on Synthroid 150 * 7.5/week, and she continues on this. Since our last visit she had neck US and labs 11/2020    Her treatment course has been as follows:  2 operations for the thyroid cancer:   2/6/17 total thyroidectomy  (Dr Espinoza Taylor, [facility])  unifocal 2.5 cm Papillary thyroid carcinoma, isthmus with extension into right and left lobes.  3/6 lymph nodes were + for metastatic thyroid cancer.  Background lymphocytic thyroiditis.   4/13/17: 2 dose Thyrogen TBS followed by 131I 101 mCi on 4/13/17 ([facility] with abnormal post therapy TBS  raising questions about the left lower quadrant, including left pelvis or GI.  11/8/17: reoperative CND level 6 and 7 ([facility])- 5 nodes + for PCT - largest 1.8 cm, extranodal extension present, focal  4/5/18 2.98 mCi 123I TBS: negative    We have the following tumor marker data:   2/16/17 Surgery  4/13/17: Tg 0.1, SIMON 210.3  4/13/17: 101 mci 131I  4/21/17: Tg 4.2, SIMON 115, TSH 5.97  8/23/17 : Tg 3.7, SIMON 115 -  11/17 Operation # 2  1/15/18: TSH 0.36  1/30/18 Tg 3.1, SIMON 17, TSH not measured  4/6/18 Tg 3, SIMON 9.4, TSH 23.8 day 5 thyrogen    7/6/18: Tg 1.9, SIMON 7.1, TSH 0.23  1/22/19: Tg 1.5, SIMON 6.5, TSH 0.3, free T4 1.72 -   7/22/19 Tg 1.8, SIMON 4.7, TSH 0.16, free T4 1.53,  Tg 1.8, SIMON 4.7  1/22/2020  Tg 2.2 ng/ml, SIMON  3.5 U/ml, TSH 0.1, free T4 1.71-   6/23/2020 covid negative   11/16/2020 Tg 2.3, SIMON 3.8, TSH 0.54, free T4 1.48 -        images on PACS   4/13/17: 123I 6.6 mCi total body scan  (Thyrogen stimulated; [facility]) moderate uptake thyroid bed.   4/13/17: 101 mCi 131I  4/21/17 post treatment TBS -raising questions about the left lower quadrant, including left pelvis or GI.  4/21/17: post therapy scan ; neck uptake.  I wouldn't have called - tiny focus left abdomen lateral  And also 2 foci midline pelvis- one I would attribute to bladder and the other ?   4/28/17 CT pelvis -sclerotic bone lesions as described by radiologist.do not correspond to likely location of 131I uptake seen on outside TBS . Radiologist states some definitely represent bone islands but some are indeterminant .  5/5/17 bone scan: 2 areas noted by radiologist s- anterior column left acetabulum and right sacral body.    4/5/18 2.98 mCi 123I TBS (thyrogen stimulated):   2/17/2020 CT pelvis - stable.  Nonspecific sclerotic bony foci throughout pelvis not substantially changed compared with 4/2017  neck US 11/16/2020:   Right level 4 # 1 00.4 x 0.7 x 1.1 cm -- I am unable to see this on the long cine .  There is no trans level 2-4 cine. I do not see this on the 7/19 leve 2-4 trans cine unless it is at 12:42:30 PM location  ; right level 6-7 cine 3:50:22  Left level 3 # 1 1 x 0.5 x 1.6 cm  (was 0.8 x 0.5 x 1.5 cm)  Left level 4 # 2 0.3  X 0.5 x 0.9 cm   Left level 5 0.3 tall  Left level 6 # 1  0.3 x 0.3 x 0.6 cm (was 0.4 x 0.4 x 0.8 cm)  Left level 6 # 2 0.3 x 0.3 x 0.7 cm (was 0.4 x 0.3 x 0.7 cm )  Left level 6 # 3  0.3 x 0.2 x 0.3 cm (was 0.4 x 0.3 x 0.5 cm )      REVIEW OF SYSTEMS  She has had covid vaccine  Still on anticoagulation  Energy pretty good  Don't get out as much; not much exercise  Sleep is OK  Cardiac: negative  Respiratory:  Negative  GI: negative  No bone pain  Less headaches  No periods with IuD  Worries about things    Past Medical History  Past Medical History:   Diagnosis Date     Acute deep vein thrombosis (DVT) of left lower extremity (H) 01/25/2020     Acute pulmonary embolism without acute cor  "pulmonale (H) 01/25/2020     Papillary thyroid carcinoma (H) 02/06/2017     PONV (postoperative nausea and vomiting)      Post-surgical hypothyroidism 02/06/2017     Past Surgical History:   Procedure Laterality Date     EXPLORE NECK N/A 11/8/2017    Procedure: EXPLORE NECK;  Re-Operative Central Neck Dissection ;  Surgeon: Yanet Cordova MD;  Location: [facility] OR     total thyroidectomy  02/06/2017    [facility], Dr Espinoza Taylor       Medications    Current Outpatient Medications   Medication Sig Dispense Refill     acetaminophen (TYLENOL) 325 MG tablet Take 2 tablets (650 mg) by mouth every 4 hours as needed for other (mild pain) 100 tablet 0     apixaban ANTICOAGULANT (ELIQUIS) 5 MG tablet Take 5 mg by mouth 2 times daily       levonorgestrel (MIRENA) 20 MCG/24HR IUD        SYNTHROID 150 MCG tablet MON to SAT 1 tablet/day;SUN 1.5 tablet 96 tablet 4     cholecalciferol (VITAMIN D3) 5000 UNITS CAPS capsule Take by mouth daily       Vitamin D is not every day    Social History  Social History     Tobacco Use     Smoking status: Never Smoker     Smokeless tobacco: Never Used   Substance Use Topics     Alcohol use: No     Drug use: No     Working from home; kids are in school remotely still;     EXAM  GENERAL: young woman in NAD  There were no vitals taken for this visit.   BP Readings from Last 1 Encounters:   01/22/20 127/85      Pulse Readings from Last 1 Encounters:   01/22/20 93      Resp Readings from Last 1 Encounters:   11/08/17 16      Temp Readings from Last 1 Encounters:   11/08/17 98.3  F (36.8  C) (Axillary)      SpO2 Readings from Last 1 Encounters:   11/08/17 95%      Wt Readings from Last 1 Encounters:   01/22/20 81.4 kg (179 lb 8 oz)      Ht Readings from Last 1 Encounters:   07/22/19 1.715 m (5' 7.5\")   GENERAL: no distress; she appears well  SKIN: Visible skin clear.   EYES: Eyes grossly normal to inspection.   RESP: No audible wheeze, cough, or visible cyanosis.  No visible retractions or " increased work of breathing.    NEURO:   Awake, alert, responds appropriately to questions.  Mentation and speech fluent.  PSYCH:affect normal,  and appearance well-groomed.        ENDO THYROID LABS-[facility] Latest Ref Rng & Units 11/16/2020 1/22/2020   TSH 0.40 - 4.00 mU/L 0.54 0.10 (L)   T4 FREE 0.76 - 1.46 ng/dL 1.48 (H) 1.71 (H)       EXAMINATION: US HEAD NECK SOFT TISSUE, 11/16/2020 4:38 PM COMPARISON: 7/29/2019HISTORY: Papillary thyroid carcinoma (H); Postsurgical hypothyroidism   FINDINGS:  Lymph nodes are measured bilaterally with measurements given incraniocaudal, transverse and AP dimensions as follows:     Right:  Level 2: 1.3 x 0.5 x 1.5 cm ovoid hypoechoic lymph node with a fattyhilum, unchanged.  Level 4: 0.4 x 0.7 x 1.1 cm ovoid hypoechoic lymph node withsuggestion of a fatty hilum, similar to prior when compared on cineimages.     Left:  Level 2: 1.2 x 0.7 x 1.7 cm ovoid hypoechoic lymph node with a fattyhilum, unchanged.  Level 3: 1.0 x 0.5 x 1.6 cm ovoid hypoechoic lymph node withsuggestion of a fatty hilum (incorrectly labeled as on the right sideon some PACS images), slightly increased since prior when it measured0.8 x 0.5 x 1.5 cm.   Level 4: 0.3 x 0.5 x 0.9 cm ovoid hypoechoic lymph node withsuggestion of a fatty hilum, minimally increased in size when comparedto prior on cine images.  Level 5: Subcentimeter morphologically normal lymph nodes noted, notsignificantly changed.  Level 6: Tiny hypoechoic foci in the left thyroidectomy bed,unchanged.                                                                  IMPRESSION:  Soft tissue neck ultrasound with lymph node measurements as describedabove. Slightly increased left level 3 and left level 4 lymph nodeswithout highly suspicious features.  RERE PATRICK, DO

## 2021-05-12 NOTE — PATIENT INSTRUCTIONS
Get follow up labs at your convenience    I still send mychart results when they come through      See me every 6-12 months

## 2021-07-06 ENCOUNTER — TELEPHONE (OUTPATIENT)
Dept: ENDOCRINOLOGY | Facility: CLINIC | Age: 46
End: 2021-07-06

## 2021-07-06 NOTE — TELEPHONE ENCOUNTER
Prior Authorization Retail Medication Request    Medication/Dose: SYNTHROID 150 MCG tablet  ICD code (if different than what is on RX):C73  Rationale:Papillary thyroid carcinoma    Insurance Name:[employer]   Insurance ID:[id number]       Pharmacy Information (if different than what is on RX)  Name:    Phone:

## 2021-07-07 NOTE — TELEPHONE ENCOUNTER
Central Prior Authorization Team   Phone: [phone]      PA Initiation    Medication: SYNTHROID 150 MCG tablet-PA initiated  Insurance Company: [employer] - Phone [phone] Fax [phone]  Pharmacy Filling the Rx: [id number] [place], [place] - [address] AT [facility] OF  [address]  Filling Pharmacy Phone: [phone]  Filling Pharmacy Fax:    Start Date: 7/7/2021

## 2021-07-08 NOTE — TELEPHONE ENCOUNTER
"Prior Authorization Not Needed per Insurance-Per Silvestre at [employer], this is covered, but not preferred. Preferred levythyroxine is less expensive. While patient can fill Synthroid, she will pay a higher copay. There is no exception allowed for the cost. When I called pharmacy, they said patient has picked this up and it did not go through her insurance. They are still getting a \"drug not on formulary\" exception. I called [facility] again and Aaliyah gave me the same information. Pharmacy has an override code in their rejection to use and if they need help, they need to contact the help desk directly.    Medication: SYNTHROID 150 MCG tablet-PA not needed  Insurance Company: [employer] - Phone [phone] Fax [phone]  Expected CoPay:      Pharmacy Filling the Rx: [id number] - [facility], [place] - [address] AT [facility] OF  [address]  Pharmacy Notified: Yes  Patient Notified: No      "

## 2021-07-16 ENCOUNTER — LAB (OUTPATIENT)
Dept: LAB | Facility: CLINIC | Age: 46
End: 2021-07-16
Payer: COMMERCIAL

## 2021-07-16 DIAGNOSIS — M89.9 BONE LESION: ICD-10-CM

## 2021-07-16 DIAGNOSIS — C73 PAPILLARY THYROID CARCINOMA (H): ICD-10-CM

## 2021-07-16 DIAGNOSIS — E89.0 POSTSURGICAL HYPOTHYROIDISM: ICD-10-CM

## 2021-07-16 PROCEDURE — 84439 ASSAY OF FREE THYROXINE: CPT

## 2021-07-16 PROCEDURE — 84432 ASSAY OF THYROGLOBULIN: CPT | Mod: 90

## 2021-07-16 PROCEDURE — 36415 COLL VENOUS BLD VENIPUNCTURE: CPT

## 2021-07-16 PROCEDURE — 99000 SPECIMEN HANDLING OFFICE-LAB: CPT

## 2021-07-16 PROCEDURE — 84443 ASSAY THYROID STIM HORMONE: CPT

## 2021-07-16 PROCEDURE — 86800 THYROGLOBULIN ANTIBODY: CPT | Mod: 90

## 2021-07-17 LAB
T4 FREE SERPL-MCNC: 1.47 NG/DL (ref 0.76–1.46)
TSH SERPL DL<=0.005 MIU/L-ACNC: 0.64 MU/L (ref 0.4–4)

## 2021-07-28 LAB — SCANNED LAB RESULT: ABNORMAL

## 2021-08-02 DIAGNOSIS — E89.0 POSTSURGICAL HYPOTHYROIDISM: ICD-10-CM

## 2021-08-02 DIAGNOSIS — C73 PAPILLARY THYROID CARCINOMA (H): Primary | ICD-10-CM

## 2021-08-02 RX ORDER — LEVOTHYROXINE SODIUM 175 MCG
TABLET ORAL
Qty: 90 TABLET | Refills: 4 | Status: SHIPPED | OUTPATIENT
Start: 2021-08-02 | End: 2022-11-29

## 2021-09-19 ENCOUNTER — HEALTH MAINTENANCE LETTER (OUTPATIENT)
Age: 46
End: 2021-09-19

## 2021-09-29 ENCOUNTER — TELEPHONE (OUTPATIENT)
Dept: ENDOCRINOLOGY | Facility: CLINIC | Age: 46
End: 2021-09-29
Payer: COMMERCIAL

## 2021-09-29 NOTE — TELEPHONE ENCOUNTER
PA needed on Synthroid RYAN 175 mcg  1 tablet by mouth Monday- Saturday  And 0.5 tab on Sunday   Post surgical hypothyroid E89.0  Thyroid cancer C73  Has used levothyroxine in the past which  changes causing a change in  Labs. With Thyroid cancer needs to stay suppressed to not stimulate tumor growth.  [employer]  Plan:               [facility]  Sponsor Code:       [id number]  Subscriber ID:      [id number]      [facility]  [phone]  Jennifer Jenkins RN on 9/29/2021 at 5:16 PM

## 2021-09-30 NOTE — TELEPHONE ENCOUNTER
Central Prior Authorization Team   Phone: [phone]      PA Initiation    Medication: SYNTHROID 175 MCG tablet-PA initiated  Insurance Company: [employer] - Phone [phone] Fax [phone]  Pharmacy Filling the Rx: [facility][id number] [place], [place] - [address] AT [facility] OF  [address]  Filling Pharmacy Phone: [phone]  Filling Pharmacy Fax:    Start Date: 9/30/2021

## 2021-10-06 NOTE — TELEPHONE ENCOUNTER
Prior Authorization Not Needed per Insurance-Patient can get this medication, but would pay a higher copay  Medication: SYNTHROID 175 MCG tablet-PA Not Needed  Insurance Company: [employer] - Phone [phone] Fax [phone]  Expected CoPay:      Pharmacy Filling the Rx: [facility] #[id number] - [place], [place] - [address] AT [facility] OF  [address]  Pharmacy Notified: Yes-left message  Patient Notified:Yes-left message

## 2022-01-09 ENCOUNTER — HEALTH MAINTENANCE LETTER (OUTPATIENT)
Age: 47
End: 2022-01-09

## 2022-03-06 ENCOUNTER — HEALTH MAINTENANCE LETTER (OUTPATIENT)
Age: 47
End: 2022-03-06

## 2022-11-21 ENCOUNTER — HEALTH MAINTENANCE LETTER (OUTPATIENT)
Age: 47
End: 2022-11-21

## 2022-11-29 ENCOUNTER — VIRTUAL VISIT (OUTPATIENT)
Dept: ENDOCRINOLOGY | Facility: CLINIC | Age: 47
End: 2022-11-29
Payer: COMMERCIAL

## 2022-11-29 DIAGNOSIS — E89.0 POSTSURGICAL HYPOTHYROIDISM: ICD-10-CM

## 2022-11-29 DIAGNOSIS — C73 PAPILLARY THYROID CARCINOMA (H): Primary | ICD-10-CM

## 2022-11-29 DIAGNOSIS — C73 PAPILLARY THYROID CARCINOMA (H): ICD-10-CM

## 2022-11-29 DIAGNOSIS — M89.9 BONE LESION: ICD-10-CM

## 2022-11-29 DIAGNOSIS — R76.8 THYROGLOBULIN ANTIBODY POSITIVE: ICD-10-CM

## 2022-11-29 PROCEDURE — 99214 OFFICE O/P EST MOD 30 MIN: CPT | Mod: GT

## 2022-11-29 RX ORDER — LEVOTHYROXINE SODIUM 175 MCG
TABLET ORAL
Qty: 90 TABLET | Refills: 4 | Status: SHIPPED | OUTPATIENT
Start: 2022-11-29 | End: 2023-12-13

## 2022-11-29 RX ORDER — LEVOTHYROXINE SODIUM 175 MCG
TABLET ORAL
Qty: 90 TABLET | Refills: 4 | Status: CANCELLED | OUTPATIENT
Start: 2022-11-29

## 2022-11-29 NOTE — TELEPHONE ENCOUNTER
Pharmacy faxes request for Synthroid refill.   Rx at previous dose pended to provider.   Lab orders .   CCs notified to help schedule follow up visit.

## 2022-11-29 NOTE — PROGRESS NOTES
Joy Quispe is being evaluated via a billable video visit.        How would you like to obtain your AVS? [employer]  For the video visit, send the invitation by: Text to cell phone: [phone]  Will anyone else be joining your video visit? No

## 2022-11-29 NOTE — PATIENT INSTRUCTIONS
Labs every 6-12 months - standing orders     Neck ultrasound    CT pelvis    See me every 6-12 months

## 2022-11-29 NOTE — LETTER
11/29/2022       RE: Joy Quispe  [address] [place] [address]     Dear Colleague,    Thank you for referring your patient, Joy Quispe, to the [facility] ENDOCRINOLOGY [facility] [place] at [facility]. Please see a copy of my visit note below.    Endocrine video visits progress note    Attending Assessment/Plan :     1.  Papillary thyroid carcinoma, unifocal 2.5 cm, isthmus region, LN + including bulky notes up to 1.8 cm with extranodal extension  pT2, pN1, pMx, possible cM1 but this is very unclear .  MACIS3.95 best case, and 7.95 worst case if she has distant mets and incomplete resection  She has had 2 neck operations, 131I Rx x 1.    Labs every 6 months  See me every 6-12 months    2.  Post surgical hypothyroidism. Labs today. Treat to TSH < 0.4.    on Synthroid 150 * 6.5/week, divided..      3.  Abnormal  post therapy TBS performed at [facility]  - as per # 4.  Normal 123I TBS here 4/18.       4.  Abnormal bone CT, abnormal bone scan .  A Right sacral ala on bone scan  - sclerotic on CT coronal  B) left acetabular on bone scan, sclerotic- - this area is likely too low to correspond to what was seen on 131I .    123I TBS 4/18 doesn't show uptake here, which goes against it being bone mets, but doesn't 100 % exclude it.  We have not performed PET - this would be a consideration for the future.     5.  Thyroglobulin antibody present  . As per # 1    Due to the COVID 19 pandemic this visit was a video visit. The patient gave verbal consent for the visit today.    I have independently reviewed and interpreted labs, imaging as indicated.     Distant Location (provider location):  Off-site  Mode of Communication:  Video Conference via [employer]  Chart review/prep time 1  [id number]  Visit Start time  1540   Visit Stop time  1550   __ minutes spent on the date of the encounter doing chart review, history and exam, documentation and further activities  as noted above.  Katy Bah MD      Cc/ HPI :  Joy presents for follow up of papillary thyroid carcinoma, post surgical hypothyroidism, abnormal bone imaging. I last saw her 5/2021. At that time she was on Synthroid 150 * 6.5/week, and she continues on this.    Her treatment course has been as follows:  2 operations for the thyroid cancer:   2/6/17 total thyroidectomy  (Dr Espinoza Taylor, [facility])  unifocal 2.5 cm Papillary thyroid carcinoma, isthmus with extension into right and left lobes.  3/6 lymph nodes were + for metastatic thyroid cancer.  Background lymphocytic thyroiditis.   4/13/17: 2 dose Thyrogen TBS followed by 131I 101 mCi on 4/13/17 ([facility] with abnormal post therapy TBS  raising questions about the left lower quadrant, including left pelvis or GI.  11/8/17: [facility] 5 nodes + for PCT - largest 1.8 cm, extranodal extension present, focal  4/5/18 2.98 mCi 123I TBS: negative    We have the following tumor marker data:   2/16/17 Surgery  4/13/17: Tg 0.1, SIMON 210.3  4/13/17: 101 mci 131I  4/21/17: Tg 4.2, SIMON 115, TSH 5.97  8/23/17 : Tg 3.7, SIMON 115 -  11/17 Operation # 2  1/15/18: TSH 0.36  1/30/18 Tg 3.1, SIMON 17, TSH not measured  4/6/18 Tg 3, SIMON 9.4, TSH 23.8 day 5 thyrogen    7/6/18: Tg 1.9, SIMON 7.1, TSH 0.23  1/22/19: Tg 1.5, SIMON 6.5, TSH 0.3, free T4 1.72 -   7/22/19 Tg 1.8, SIMON 4.7, TSH 0.16, free T4 1.53,  Tg 1.8, SIMON 4.7  1/22/2020  Tg 2.2 ng/ml, SIMON  3.5 U/ml, TSH 0.1, free T4 1.71-   6/23/2020 covid negative   11/16/2020 Tg 2.3, SIMON 3.8, TSH 0.54, free T4 1.48 -   7/16/2021: Tg 1.9, SIMON 3.9, TSH 0.64, free T4 1.47       images on PACS   4/13/17: 123I 6.6 mCi total body scan (Thyrogen stimulated; [facility]) moderate uptake thyroid bed.   4/13/17: 101 mCi 131I  4/21/17 post treatment TBS -raising questions about the left lower quadrant, including left pelvis or GI.  4/21/17: post therapy scan ; neck uptake.  I wouldn't have called - tiny  focus left abdomen lateral  And also 2 foci midline pelvis- one I would attribute to bladder and the other ?   4/28/17 CT pelvis -sclerotic bone lesions as described by radiologist.do not correspond to likely location of 131I uptake seen on outside TBS . Radiologist states some definitely represent bone islands but some are indeterminant .  5/5/17 bone scan: 2 areas noted by radiologist s- anterior column left acetabulum and right sacral body.    4/5/18 2.98 mCi 123I TBS (thyrogen stimulated):   2/17/2020 CT pelvis - stable.  Nonspecific sclerotic bony foci throughout pelvis not substantially changed compared with 4/2017  neck US 11/16/2020:   Right level 4 # 1 00.4 x 0.7 x 1.1 cm -- I am unable to see this on the long cine .  There is no trans level 2-4 cine. I do not see this on the 7/19 leve 2-4 trans cine unless it is at 12:42:30 PM location  ; right level 6-7 cine 3:50:22  Left level 3 # 1 1 x 0.5 x 1.6 cm  (was 0.8 x 0.5 x 1.5 cm)  Left level 4 # 2 0.3  X 0.5 x 0.9 cm   Left level 5 0.3 tall  Left level 6 # 1  0.3 x 0.3 x 0.6 cm (was 0.4 x 0.4 x 0.8 cm)  Left level 6 # 2 0.3 x 0.3 x 0.7 cm (was 0.4 x 0.3 x 0.7 cm )  Left level 6 # 3  0.3 x 0.2 x 0.3 cm (was 0.4 x 0.3 x 0.5 cm )    REVIEW OF SYSTEMS  Weight stable   Energy OK  Sleep OK  Cardiac: negative  Respiratory negative  GI: negative  No periods with Mirena  Suspects she had covid in 2020 but never had it since then    Past Medical History  Past Medical History:   Diagnosis Date     Acute deep vein thrombosis (DVT) of left lower extremity (H) 01/25/2020     Acute pulmonary embolism without acute cor pulmonale (H) 01/25/2020     Papillary thyroid carcinoma (H) 02/06/2017     PONV (postoperative nausea and vomiting)      Post-surgical hypothyroidism 02/06/2017     Past Surgical History:   Procedure Laterality Date     EXPLORE NECK N/A 11/8/2017    Procedure: EXPLORE NECK;  Re-Operative Central Neck Dissection ;  Surgeon: Yanet Cordova MD;  Location:  "[facility] OR     total thyroidectomy  02/06/2017    [facility], Dr Espinoza Taylor       Medications    Current Outpatient Medications   Medication Sig Dispense Refill     acetaminophen (TYLENOL) 325 MG tablet Take 2 tablets (650 mg) by mouth every 4 hours as needed for other (mild pain) 100 tablet 0     apixaban ANTICOAGULANT (ELIQUIS) 5 MG tablet Take 5 mg by mouth 2 times daily       cholecalciferol (VITAMIN D3) 5000 UNITS CAPS capsule Take by mouth daily       levonorgestrel (MIRENA) 20 MCG/24HR IUD        SYNTHROID 175 MCG tablet MON to SAT 1 tablet/day; SUN 0.5 tablet (dose average 162.5 mcg/day over the course of a week) 90 tablet 4     Vitamin D is not every day    Social History  Social History     Tobacco Use     Smoking status: Never     Smokeless tobacco: Never   Substance Use Topics     Alcohol use: No     Drug use: No     New job - mostly working from home, goes in 1-2 days/week; \"crazy\".       EXAM  GENERAL: young woman in NAD  There were no vitals taken for this visit.   BP Readings from Last 1 Encounters:   01/22/20 127/85      Pulse Readings from Last 1 Encounters:   01/22/20 93      Resp Readings from Last 1 Encounters:   11/08/17 16      Temp Readings from Last 1 Encounters:   11/08/17 98.3  F (36.8  C) (Axillary)      SpO2 Readings from Last 1 Encounters:   11/08/17 95%      Wt Readings from Last 1 Encounters:   01/22/20 81.4 kg (179 lb 8 oz)      Ht Readings from Last 1 Encounters:   07/22/19 1.715 m (5' 7.5\")   GENERAL: no distress; she appears well  SKIN: Visible skin clear.   EYES: Eyes grossly normal to inspection.   RESP: No audible wheeze, cough, or  increased work of breathing.    NEURO:   Awake, alert, responds appropriately to questions.  Mentation and speech fluent.  PSYCH:affect normal,  and appearance well-groomed.        Joy Quispe is being evaluated via a billable video visit.        How would you like to obtain your AVS? MyChart  For the video visit, send the invitation by: Text to " cell phone: [phone]  Will anyone else be joining your video visit? No        Again, thank you for allowing me to participate in the care of your patient.      Sincerely,    Katy Bah MD

## 2022-11-29 NOTE — PROGRESS NOTES
Endocrine video visits progress note    Attending Assessment/Plan :     1.  Papillary thyroid carcinoma, unifocal 2.5 cm, isthmus region, LN + including bulky notes up to 1.8 cm with extranodal extension  pT2, pN1, pMx, possible cM1 but this is very unclear .  MACIS3.95 best case, and 7.95 worst case if she has distant mets and incomplete resection  She has had 2 neck operations, 131I Rx x 1.    Labs every 6 months  See me every 6-12 months    Addendum  3/9/2023 Tg 2.2, SIMON 2.7, TSH 0.52, free T4 1.47    2.  Post surgical hypothyroidism. Labs today. Treat to TSH < 0.4.    on Synthroid 150 * 6.5/week, divided..      3.  Abnormal  post therapy TBS performed at [facility]  - as per # 4.  Normal 123I TBS here 4/18.       4.  Abnormal bone CT, abnormal bone scan .  A Right sacral ala on bone scan  - sclerotic on CT coronal  B) left acetabular on bone scan, sclerotic- - this area is likely too low to correspond to what was seen on 131I .    123I TBS 4/18 doesn't show uptake here, which goes against it being bone mets, but doesn't 100 % exclude it.  We have not performed PET - this would be a consideration for the future.     5.  Thyroglobulin antibody present  . As per # 1    Due to the COVID 19 pandemic this visit was a video visit. The patient gave verbal consent for the visit today.    I have independently reviewed and interpreted labs, imaging as indicated.     Distant Location (provider location):  Off-site  Mode of Communication:  Video Conference via [employer]OM[employer]  Chart review/prep time 1  1413-1208  Visit Start time  1540   Visit Stop time  1550   __ minutes spent on the date of the encounter doing chart review, history and exam, documentation and further activities as noted above.  Katy Bah MD      Cc/ HPI :  Joy presents for follow up of papillary thyroid carcinoma, post surgical hypothyroidism, abnormal bone imaging. I last saw her 5/2021. At that time she was on Synthroid 150 * 6.5/week, and she  continues on this.    Her treatment course has been as follows:  2 operations for the thyroid cancer:   2/6/17 total thyroidectomy  (Dr Espinoza Taylor, [facility])  unifocal 2.5 cm Papillary thyroid carcinoma, isthmus with extension into right and left lobes.  3/6 lymph nodes were + for metastatic thyroid cancer.  Background lymphocytic thyroiditis.   4/13/17: 2 dose Thyrogen TBS followed by 131I 101 mCi on 4/13/17 ([facility] with abnormal post therapy TBS  raising questions about the left lower quadrant, including left pelvis or GI.  11/8/17: reoperative CND level 6 and 7 ([facility])- 5 nodes + for PCT - largest 1.8 cm, extranodal extension present, focal  4/5/18 2.98 mCi 123I TBS: negative    We have the following tumor marker data:   2/16/17 Surgery  4/13/17: Tg 0.1, SIMON 210.3  4/13/17: 101 mci 131I  4/21/17: Tg 4.2, SIMON 115, TSH 5.97  8/23/17 : Tg 3.7, SIMON 115 -  11/17 Operation # 2  1/15/18: TSH 0.36  1/30/18 Tg 3.1, SIMON 17, TSH not measured  4/6/18 Tg 3, SIMON 9.4, TSH 23.8 day 5 thyrogen    7/6/18: Tg 1.9, SIMON 7.1, TSH 0.23  1/22/19: Tg 1.5, SIMON 6.5, TSH 0.3, free T4 1.72 -   7/22/19 Tg 1.8, SIMON 4.7, TSH 0.16, free T4 1.53,  Tg 1.8, SIMON 4.7  1/22/2020  Tg 2.2 ng/ml, SIMON  3.5 U/ml, TSH 0.1, free T4 1.71-   6/23/2020 covid negative   11/16/2020 Tg 2.3, SIMON 3.8, TSH 0.54, free T4 1.48 -   7/16/2021: Tg 1.9, SIMON 3.9, TSH 0.64, free T4 1.47       images on PACS   4/13/17: 123I 6.6 mCi total body scan (Thyrogen stimulated; [facility]) moderate uptake thyroid bed.   4/13/17: 101 mCi 131I  4/21/17 post treatment TBS -raising questions about the left lower quadrant, including left pelvis or GI.  4/21/17: post therapy scan ; neck uptake.  I wouldn't have called - tiny focus left abdomen lateral  And also 2 foci midline pelvis- one I would attribute to bladder and the other ?   4/28/17 CT pelvis -sclerotic bone lesions as described by radiologist.do not correspond to likely location of 131I uptake seen on outside TBS  . Radiologist states some definitely represent bone islands but some are indeterminant .  5/5/17 bone scan: 2 areas noted by radiologist s- anterior column left acetabulum and right sacral body.    4/5/18 2.98 mCi 123I TBS (thyrogen stimulated):   2/17/2020 CT pelvis - stable.  Nonspecific sclerotic bony foci throughout pelvis not substantially changed compared with 4/2017  neck US 11/16/2020:   Right level 4 # 1 00.4 x 0.7 x 1.1 cm -- I am unable to see this on the long cine .  There is no trans level 2-4 cine. I do not see this on the 7/19 leve 2-4 trans cine unless it is at 12:42:30 PM location  ; right level 6-7 cine 3:50:22  Left level 3 # 1 1 x 0.5 x 1.6 cm  (was 0.8 x 0.5 x 1.5 cm)  Left level 4 # 2 0.3  X 0.5 x 0.9 cm   Left level 5 0.3 tall  Left level 6 # 1  0.3 x 0.3 x 0.6 cm (was 0.4 x 0.4 x 0.8 cm)  Left level 6 # 2 0.3 x 0.3 x 0.7 cm (was 0.4 x 0.3 x 0.7 cm )  Left level 6 # 3  0.3 x 0.2 x 0.3 cm (was 0.4 x 0.3 x 0.5 cm )    REVIEW OF SYSTEMS  Weight stable   Energy OK  Sleep OK  Cardiac: negative  Respiratory negative  GI: negative  No periods with Mirena  Suspects she had covid in 2020 but never had it since then    Past Medical History  Past Medical History:   Diagnosis Date     Acute deep vein thrombosis (DVT) of left lower extremity (H) 01/25/2020     Acute pulmonary embolism without acute cor pulmonale (H) 01/25/2020     Papillary thyroid carcinoma (H) 02/06/2017     PONV (postoperative nausea and vomiting)      Post-surgical hypothyroidism 02/06/2017     Past Surgical History:   Procedure Laterality Date     EXPLORE NECK N/A 11/8/2017    Procedure: EXPLORE NECK;  Re-Operative Central Neck Dissection ;  Surgeon: Yanet Cordova MD;  Location: [facility] OR     [facility]       Medications    Current Outpatient Medications   Medication Sig Dispense Refill     acetaminophen (TYLENOL) 325 MG tablet Take 2 tablets (650 mg) by mouth every 4 hours  "as needed for other (mild pain) 100 tablet 0     apixaban ANTICOAGULANT (ELIQUIS) 5 MG tablet Take 5 mg by mouth 2 times daily       cholecalciferol (VITAMIN D3) 5000 UNITS CAPS capsule Take by mouth daily       levonorgestrel (MIRENA) 20 MCG/24HR IUD        SYNTHROID 175 MCG tablet MON to SAT 1 tablet/day; SUN 0.5 tablet (dose average 162.5 mcg/day over the course of a week) 90 tablet 4     Vitamin D is not every day    Social History  Social History     Tobacco Use     Smoking status: Never     Smokeless tobacco: Never   Substance Use Topics     Alcohol use: No     Drug use: No     New job - mostly working from home, goes in 1-2 days/week; \"crazy\".       EXAM  GENERAL: young woman in NAD  There were no vitals taken for this visit.   BP Readings from Last 1 Encounters:   01/22/20 127/85      Pulse Readings from Last 1 Encounters:   01/22/20 93      Resp Readings from Last 1 Encounters:   11/08/17 16      Temp Readings from Last 1 Encounters:   11/08/17 98.3  F (36.8  C) (Axillary)      SpO2 Readings from Last 1 Encounters:   11/08/17 95%      Wt Readings from Last 1 Encounters:   01/22/20 81.4 kg (179 lb 8 oz)      Ht Readings from Last 1 Encounters:   07/22/19 1.715 m (5' 7.5\")   GENERAL: no distress; she appears well  SKIN: Visible skin clear.   EYES: Eyes grossly normal to inspection.   RESP: No audible wheeze, cough, or  increased work of breathing.    NEURO:   Awake, alert, responds appropriately to questions.  Mentation and speech fluent.  PSYCH:affect normal,  and appearance well-groomed.      "

## 2022-11-30 ENCOUNTER — TELEPHONE (OUTPATIENT)
Dept: ENDOCRINOLOGY | Facility: CLINIC | Age: 47
End: 2022-11-30

## 2022-11-30 NOTE — TELEPHONE ENCOUNTER
LVM with following information  Labs   US   CT   RTC 6-12 months; OK to use thyroid cancer return LUIS   Left scheduling number.    Taylor Myhre,VVF

## 2023-03-01 ENCOUNTER — TELEPHONE (OUTPATIENT)
Dept: ENDOCRINOLOGY | Facility: CLINIC | Age: 48
End: 2023-03-01
Payer: COMMERCIAL

## 2023-03-01 NOTE — TELEPHONE ENCOUNTER
Central Prior Authorization Team   Phone: [phone]    PA Initiation    Medication: Synthroid 175MCG tablets    Insurance Company: [employer] - Phone [phone] Fax [phone]  Pharmacy Filling the Rx: [id number] - [place], [place] - [address] AT [facility] OF  [address]  Filling Pharmacy Phone: [phone]  Filling Pharmacy Fax:    Start Date: 3/1/2023

## 2023-03-01 NOTE — TELEPHONE ENCOUNTER
[facility] Call Center    Phone Message    May a detailed message be left on voicemail: yes     Reason for Call: Medication Question or concern regarding medication   Prescription Clarification  Name of Medication: SYNTHROID 175 MCG tablet  Prescribing Provider: Dr Bah   Pharmacy: [facility]   What on the order needs clarification? Patient needs a prior auth for her medication and only have 5 days left in her supply please assist via web on eASIC or call [phone]  To help with a prior authorization for her thank you          Action Taken: Other: endo    Travel Screening: Not Applicable

## 2023-03-03 NOTE — TELEPHONE ENCOUNTER
Called [employer] to check on status of request-    This is still in process case# [id number]    This was marked for urgent review

## 2023-03-06 NOTE — TELEPHONE ENCOUNTER
Called [employer] regarding PA request-     Still in process case#[id number]    Per call to insurance Suzie is currently behind on requests.  May take up to 72 business hours

## 2023-03-07 NOTE — TELEPHONE ENCOUNTER
Called Suzie [phone]-    PA has been sent to medical director for review.  Rep requested that I send clinic notes showing effectiveness of brand Synthroid.

## 2023-03-08 NOTE — TELEPHONE ENCOUNTER
PRIOR AUTHORIZATION DENIED    Medication: Synthroid 175MCG tablets      Denial Date: 3/8/2023    Denial Rational:       Appeal Information: This medication was denied. If physician would like to appeal because patient has contraindication or allergy to covered medication please write letter of medical necessity and route back to PA team to initiate.  If no further action is needed please close encounter thank you.   \

## 2023-03-09 ENCOUNTER — LAB (OUTPATIENT)
Dept: LAB | Facility: CLINIC | Age: 48
End: 2023-03-09
Payer: COMMERCIAL

## 2023-03-09 DIAGNOSIS — C73 PAPILLARY THYROID CARCINOMA (H): ICD-10-CM

## 2023-03-09 DIAGNOSIS — R76.8 THYROGLOBULIN ANTIBODY POSITIVE: ICD-10-CM

## 2023-03-09 DIAGNOSIS — E89.0 POSTSURGICAL HYPOTHYROIDISM: ICD-10-CM

## 2023-03-09 LAB
T4 FREE SERPL-MCNC: 1.57 NG/DL (ref 0.76–1.46)
TSH SERPL DL<=0.005 MIU/L-ACNC: 0.52 MU/L (ref 0.4–4)

## 2023-03-09 PROCEDURE — 84443 ASSAY THYROID STIM HORMONE: CPT

## 2023-03-09 PROCEDURE — 99000 SPECIMEN HANDLING OFFICE-LAB: CPT

## 2023-03-09 PROCEDURE — 86800 THYROGLOBULIN ANTIBODY: CPT | Mod: 90

## 2023-03-09 PROCEDURE — 84439 ASSAY OF FREE THYROXINE: CPT

## 2023-03-09 PROCEDURE — 36415 COLL VENOUS BLD VENIPUNCTURE: CPT

## 2023-03-09 PROCEDURE — 84432 ASSAY OF THYROGLOBULIN: CPT | Mod: 90

## 2023-03-09 NOTE — TELEPHONE ENCOUNTER
I am not aware of reason this has to be Synthroid, other than her preference.  If she wants to continue Synthroid she can pay out of pocket.  Otherwise, its OK with me if we change to generic levothyroxine at the same dose.  She will need follow up labs 2 months after the change.  Check with her what she wants to do.thanks.  Katy Bah MD

## 2023-03-10 NOTE — TELEPHONE ENCOUNTER
Spoke with patient about below.  She said she would be willing to try levothyroxine, but she did say that they refilled her Synthroid last week for 90 days.  She hopes to wait and see if they refill the Synthroid at her next refill.  If not, then she is willing to do levothyroxine. Closing encounter.     Cece Dowd on 3/10/2023 at 9:32 AM

## 2023-03-22 LAB — SCANNED LAB RESULT: ABNORMAL

## 2023-04-16 ENCOUNTER — HEALTH MAINTENANCE LETTER (OUTPATIENT)
Age: 48
End: 2023-04-16

## 2023-04-28 ENCOUNTER — TELEPHONE (OUTPATIENT)
Dept: ENDOCRINOLOGY | Facility: CLINIC | Age: 48
End: 2023-04-28

## 2023-04-28 NOTE — TELEPHONE ENCOUNTER
Prior Authorization Approval    Authorization Effective Date: 4/10/2023  Authorization Expiration Date: 4/28/2024  Medication: Synthroid 175mcg  Approved Dose/Quantity:   Reference #:     Insurance Company: [employer] [phone] Fax [phone]  Expected CoPay:       CoPay Card Available:      Foundation Assistance Needed:    Which Pharmacy is filling the prescription (Not needed for infusion/clinic administered): [id number] [address] AT [facility] [address]  Pharmacy Notified: Yes  Patient Notified: Yes  [phone]

## 2023-04-28 NOTE — TELEPHONE ENCOUNTER
Central Prior Authorization Team   Phone: [phone]    PA Initiation    Medication: Synthroid 175mcg  Insurance Company: [employer] - Phone [phone] Fax [phone]  Pharmacy Filling the Rx: [id number] - [place], [place] - [facility] AT [facility] OF  [address]  Filling Pharmacy Phone: [phone]  Filling Pharmacy Fax:    Start Date: 4/28/2023    Received call from Suzie

## 2023-12-10 DIAGNOSIS — E89.0 POSTSURGICAL HYPOTHYROIDISM: ICD-10-CM

## 2023-12-10 DIAGNOSIS — C73 PAPILLARY THYROID CARCINOMA (H): ICD-10-CM

## 2023-12-13 RX ORDER — LEVOTHYROXINE SODIUM 175 MCG
TABLET ORAL
Qty: 90 TABLET | Refills: 4 | Status: SHIPPED | OUTPATIENT
Start: 2023-12-13

## 2024-04-18 ENCOUNTER — TELEPHONE (OUTPATIENT)
Dept: ENDOCRINOLOGY | Facility: CLINIC | Age: 49
End: 2024-04-18

## 2024-04-18 NOTE — TELEPHONE ENCOUNTER
Prior Authorization Retail Medication Request    Medication/Dose: Synthroid  175 mcg   1 tab MOnday-Saturday and 1/2 tab Sundays   Diagnosis and ICD code (if different than what is on RX):  C73  thyroid cancer post ablation   New/renewal/insurance change PA/secondary ins. PA: continuation   Previously Tried and Failed:  generic levothyroxine   Rationale:  Needs Brand  to stay suppressed due to thyroid cancer . Brand  doesn't vary in dosing triggering any cancer     Insurance   Primary: [employer]   Insurance ID:  [id number]      Pharmacy Information (if different than what is on RX)  [facility]   Phone:  [phone]  Fax:[phone]      Jennifer Jenkins RN on 4/18/2024 at 3:53 PM

## 2024-05-03 NOTE — TELEPHONE ENCOUNTER
PRIOR AUTHORIZATION DENIED    Medication: SYNTHROID 175 MCG PO TABS  Insurance Company: [employer] Non-Specialty PA's - Phone [phone] Fax [phone]  Denial Date: 5/3/2024  Denial Reason(s): Needs documentation as to why generic can't be used.      Appeal Information:     Patient Notified: No

## 2024-06-23 ENCOUNTER — HEALTH MAINTENANCE LETTER (OUTPATIENT)
Age: 49
End: 2024-06-23

## 2025-04-19 ENCOUNTER — HEALTH MAINTENANCE LETTER (OUTPATIENT)
Age: 50
End: 2025-04-19

## 2025-07-12 ENCOUNTER — HEALTH MAINTENANCE LETTER (OUTPATIENT)
Age: 50
End: 2025-07-12

## (undated) DEVICE — ESU LIGASURE OPEN SEALER/DIVIDER SM JAW 16.5MM LF1212A

## (undated) DEVICE — ESU ELEC BLADE 2.75" COATED/INSULATED E1455

## (undated) DEVICE — SOL NACL 0.9% IRRIG 1000ML BOTTLE 2F7124

## (undated) DEVICE — PACK NEURO MINOR UMMC SNE32MNMU4

## (undated) DEVICE — SU MONOCRYL 5-0 P-3 18" UND Y493G

## (undated) DEVICE — SURGICEL FIBRILLAR HEMOSTAT 4"X4" 1963

## (undated) DEVICE — SU SILK 3-0 SH CR 8X18" C013D

## (undated) DEVICE — GLOVE PROTEXIS W/NEU-THERA 6.5  2D73TE65

## (undated) DEVICE — SU SILK 4-0 TIE 12X30" A303H

## (undated) DEVICE — NIM ELEC SUBDERMAL NDL 3PAIR/BOX

## (undated) DEVICE — ESU GROUND PAD ADULT W/CORD E7507

## (undated) DEVICE — SU CHROMIC 3-0 SH 27" G122H

## (undated) DEVICE — SOL WATER IRRIG 1000ML BOTTLE 2F7114

## (undated) DEVICE — CLIP HORIZON MED BLUE 002200

## (undated) DEVICE — DRAPE SHEET REV FOLD 3/4 9349

## (undated) DEVICE — TUBE ENDOTRACHEAL NIM TRIVANTAGE 6.0MM 8229706

## (undated) DEVICE — SU SILK 3-0 TIE 12X30" A304H

## (undated) DEVICE — STRAP UNIVERSAL POSITIONING 2-PIECE 4X47X76" 91-287

## (undated) DEVICE — LINEN TOWEL PACK X6 WHITE 5487

## (undated) DEVICE — LINEN TOWEL PACK X5 5464

## (undated) DEVICE — NIM PROBE PRASS INCREMENTING TIP 8225825

## (undated) DEVICE — TUBING SMOKE EVAC 2.2CMX3M SEA3715

## (undated) DEVICE — ADHESIVE SWIFTSET 0.8ML OCTYL SS6

## (undated) DEVICE — GOWN XLG DISP 9545

## (undated) DEVICE — PREP CHLORAPREP 26ML TINTED ORANGE  260815

## (undated) DEVICE — CLIP HORIZON SM RED WIDE SLOT 001201

## (undated) RX ORDER — PROPOFOL 10 MG/ML
INJECTION, EMULSION INTRAVENOUS
Status: DISPENSED
Start: 2017-11-08

## (undated) RX ORDER — PHENYLEPHRINE HCL IN 0.9% NACL 1 MG/10 ML
SYRINGE (ML) INTRAVENOUS
Status: DISPENSED
Start: 2017-11-08

## (undated) RX ORDER — HYDROMORPHONE HYDROCHLORIDE 1 MG/ML
INJECTION, SOLUTION INTRAMUSCULAR; INTRAVENOUS; SUBCUTANEOUS
Status: DISPENSED
Start: 2017-11-08

## (undated) RX ORDER — FENTANYL CITRATE 50 UG/ML
INJECTION, SOLUTION INTRAMUSCULAR; INTRAVENOUS
Status: DISPENSED
Start: 2017-11-08

## (undated) RX ORDER — EPHEDRINE SULFATE 50 MG/ML
INJECTION, SOLUTION INTRAMUSCULAR; INTRAVENOUS; SUBCUTANEOUS
Status: DISPENSED
Start: 2017-11-08

## (undated) RX ORDER — ONDANSETRON 2 MG/ML
INJECTION INTRAMUSCULAR; INTRAVENOUS
Status: DISPENSED
Start: 2017-11-08